# Patient Record
Sex: MALE | Race: WHITE | Employment: OTHER | URBAN - METROPOLITAN AREA
[De-identification: names, ages, dates, MRNs, and addresses within clinical notes are randomized per-mention and may not be internally consistent; named-entity substitution may affect disease eponyms.]

---

## 2017-12-06 ENCOUNTER — HOSPITAL ENCOUNTER (OUTPATIENT)
Dept: CT IMAGING | Age: 72
Discharge: HOME | End: 2017-12-06
Attending: UROLOGY
Payer: MEDICARE

## 2017-12-06 ENCOUNTER — HOSPITAL ENCOUNTER (OUTPATIENT)
Dept: LAB | Age: 72
Discharge: HOME | End: 2017-12-06
Attending: UROLOGY
Payer: MEDICARE

## 2017-12-06 DIAGNOSIS — R31.0 GROSS HEMATURIA: ICD-10-CM

## 2017-12-06 LAB — CREAT SERPL-MCNC: 0.8 MG/DL (ref 0.8–1.5)

## 2017-12-06 PROCEDURE — 36415 COLL VENOUS BLD VENIPUNCTURE: CPT | Performed by: UROLOGY

## 2017-12-06 PROCEDURE — 74011636320 HC RX REV CODE- 636/320: Performed by: UROLOGY

## 2017-12-06 PROCEDURE — 74178 CT ABD&PLV WO CNTR FLWD CNTR: CPT

## 2017-12-06 PROCEDURE — 82565 ASSAY OF CREATININE: CPT | Performed by: UROLOGY

## 2017-12-06 PROCEDURE — 74011000258 HC RX REV CODE- 258: Performed by: UROLOGY

## 2017-12-06 RX ORDER — SODIUM CHLORIDE 0.9 % (FLUSH) 0.9 %
10 SYRINGE (ML) INJECTION
Status: COMPLETED | OUTPATIENT
Start: 2017-12-06 | End: 2017-12-06

## 2017-12-06 RX ADMIN — SODIUM CHLORIDE 100 ML: 900 INJECTION, SOLUTION INTRAVENOUS at 14:29

## 2017-12-06 RX ADMIN — Medication 10 ML: at 14:29

## 2017-12-06 RX ADMIN — IOPAMIDOL 100 ML: 755 INJECTION, SOLUTION INTRAVENOUS at 14:29

## 2017-12-18 ENCOUNTER — HOSPITAL ENCOUNTER (OUTPATIENT)
Dept: SURGERY | Age: 72
Discharge: HOME | End: 2017-12-18
Payer: MEDICARE

## 2017-12-18 LAB
ANION GAP SERPL CALC-SCNC: 7 MMOL/L (ref 7–16)
APPEARANCE UR: CLEAR
BILIRUB UR QL: NEGATIVE
BUN SERPL-MCNC: 15 MG/DL (ref 8–23)
CALCIUM SERPL-MCNC: 9.3 MG/DL (ref 8.3–10.4)
CHLORIDE SERPL-SCNC: 102 MMOL/L (ref 98–107)
CO2 SERPL-SCNC: 31 MMOL/L (ref 21–32)
COLOR UR: YELLOW
CREAT SERPL-MCNC: 0.97 MG/DL (ref 0.8–1.5)
ERYTHROCYTE [DISTWIDTH] IN BLOOD BY AUTOMATED COUNT: 13.2 % (ref 11.9–14.6)
GLUCOSE SERPL-MCNC: 105 MG/DL (ref 65–100)
GLUCOSE UR STRIP.AUTO-MCNC: NEGATIVE MG/DL
HCT VFR BLD AUTO: 45.5 % (ref 41.1–50.3)
HGB BLD-MCNC: 15.6 G/DL (ref 13.6–17.2)
HGB UR QL STRIP: NEGATIVE
KETONES UR QL STRIP.AUTO: NEGATIVE MG/DL
LEUKOCYTE ESTERASE UR QL STRIP.AUTO: NEGATIVE
MCH RBC QN AUTO: 31.5 PG (ref 26.1–32.9)
MCHC RBC AUTO-ENTMCNC: 34.3 G/DL (ref 31.4–35)
MCV RBC AUTO: 91.7 FL (ref 79.6–97.8)
NITRITE UR QL STRIP.AUTO: NEGATIVE
PH UR STRIP: 6.5 [PH] (ref 5–9)
PLATELET # BLD AUTO: 205 K/UL (ref 150–450)
PMV BLD AUTO: 9.9 FL (ref 10.8–14.1)
POTASSIUM SERPL-SCNC: 4.2 MMOL/L (ref 3.5–5.1)
PROT UR STRIP-MCNC: NEGATIVE MG/DL
RBC # BLD AUTO: 4.96 M/UL (ref 4.23–5.67)
SODIUM SERPL-SCNC: 140 MMOL/L (ref 136–145)
SP GR UR REFRACTOMETRY: 1.01 (ref 1–1.02)
UROBILINOGEN UR QL STRIP.AUTO: 1 EU/DL (ref 0.2–1)
WBC # BLD AUTO: 5.7 K/UL (ref 4.3–11.1)

## 2017-12-18 PROCEDURE — 87086 URINE CULTURE/COLONY COUNT: CPT | Performed by: UROLOGY

## 2017-12-18 PROCEDURE — 36415 COLL VENOUS BLD VENIPUNCTURE: CPT | Performed by: UROLOGY

## 2017-12-18 PROCEDURE — 80048 BASIC METABOLIC PNL TOTAL CA: CPT | Performed by: UROLOGY

## 2017-12-18 PROCEDURE — 81003 URINALYSIS AUTO W/O SCOPE: CPT | Performed by: UROLOGY

## 2017-12-18 PROCEDURE — 85027 COMPLETE CBC AUTOMATED: CPT | Performed by: UROLOGY

## 2017-12-20 LAB
BACTERIA SPEC CULT: NORMAL
SERVICE CMNT-IMP: NORMAL

## 2019-04-27 ENCOUNTER — HOSPITAL ENCOUNTER (INPATIENT)
Age: 74
LOS: 2 days | Discharge: HOME | DRG: 244 | End: 2019-04-29
Attending: EMERGENCY MEDICINE | Admitting: INTERNAL MEDICINE
Payer: MEDICARE

## 2019-04-27 ENCOUNTER — APPOINTMENT (OUTPATIENT)
Dept: GENERAL RADIOLOGY | Age: 74
DRG: 244 | End: 2019-04-27
Attending: EMERGENCY MEDICINE
Payer: MEDICARE

## 2019-04-27 DIAGNOSIS — R55 SYNCOPE AND COLLAPSE: Primary | ICD-10-CM

## 2019-04-27 DIAGNOSIS — I44.2 THIRD DEGREE HEART BLOCK (HCC): ICD-10-CM

## 2019-04-27 LAB
ALBUMIN SERPL-MCNC: 3.8 G/DL (ref 3.2–4.6)
ALBUMIN/GLOB SERPL: 1.2 {RATIO} (ref 1.2–3.5)
ALP SERPL-CCNC: 70 U/L (ref 50–136)
ALT SERPL-CCNC: 34 U/L (ref 12–65)
ANION GAP SERPL CALC-SCNC: 7 MMOL/L (ref 7–16)
AST SERPL-CCNC: 22 U/L (ref 15–37)
ATRIAL RATE: 111 BPM
ATRIAL RATE: 80 BPM
BASOPHILS # BLD: 0 K/UL (ref 0–0.2)
BASOPHILS NFR BLD: 1 % (ref 0–2)
BILIRUB SERPL-MCNC: 0.7 MG/DL (ref 0.2–1.1)
BUN SERPL-MCNC: 17 MG/DL (ref 8–23)
CALCIUM SERPL-MCNC: 8.6 MG/DL (ref 8.3–10.4)
CALCULATED P AXIS, ECG09: 114 DEGREES
CALCULATED P AXIS, ECG09: 86 DEGREES
CALCULATED R AXIS, ECG10: -66 DEGREES
CALCULATED R AXIS, ECG10: -67 DEGREES
CALCULATED T AXIS, ECG11: -28 DEGREES
CALCULATED T AXIS, ECG11: 62 DEGREES
CHLORIDE SERPL-SCNC: 108 MMOL/L (ref 98–107)
CO2 SERPL-SCNC: 27 MMOL/L (ref 21–32)
CREAT SERPL-MCNC: 0.93 MG/DL (ref 0.8–1.5)
DIAGNOSIS, 93000: NORMAL
DIAGNOSIS, 93000: NORMAL
DIFFERENTIAL METHOD BLD: NORMAL
EOSINOPHIL # BLD: 0.2 K/UL (ref 0–0.8)
EOSINOPHIL NFR BLD: 3 % (ref 0.5–7.8)
ERYTHROCYTE [DISTWIDTH] IN BLOOD BY AUTOMATED COUNT: 12.6 % (ref 11.9–14.6)
GLOBULIN SER CALC-MCNC: 3.1 G/DL (ref 2.3–3.5)
GLUCOSE SERPL-MCNC: 142 MG/DL (ref 65–100)
HCT VFR BLD AUTO: 46.5 % (ref 41.1–50.3)
HGB BLD-MCNC: 15.4 G/DL (ref 13.6–17.2)
IMM GRANULOCYTES # BLD AUTO: 0 K/UL (ref 0–0.5)
IMM GRANULOCYTES NFR BLD AUTO: 1 % (ref 0–5)
INR PPP: 1.1
LYMPHOCYTES # BLD: 0.8 K/UL (ref 0.5–4.6)
LYMPHOCYTES NFR BLD: 13 % (ref 13–44)
MAGNESIUM SERPL-MCNC: 2.2 MG/DL (ref 1.8–2.4)
MCH RBC QN AUTO: 30.6 PG (ref 26.1–32.9)
MCHC RBC AUTO-ENTMCNC: 33.1 G/DL (ref 31.4–35)
MCV RBC AUTO: 92.4 FL (ref 79.6–97.8)
MONOCYTES # BLD: 0.4 K/UL (ref 0.1–1.3)
MONOCYTES NFR BLD: 6 % (ref 4–12)
NEUTS SEG # BLD: 5.1 K/UL (ref 1.7–8.2)
NEUTS SEG NFR BLD: 77 % (ref 43–78)
NRBC # BLD: 0 K/UL (ref 0–0.2)
P-R INTERVAL, ECG05: 368 MS
PLATELET # BLD AUTO: 168 K/UL (ref 150–450)
PMV BLD AUTO: 9.9 FL (ref 9.4–12.3)
POTASSIUM SERPL-SCNC: 4.3 MMOL/L (ref 3.5–5.1)
PROT SERPL-MCNC: 6.9 G/DL (ref 6.3–8.2)
PROTHROMBIN TIME: 13.7 SEC (ref 11.7–14.5)
Q-T INTERVAL, ECG07: 392 MS
Q-T INTERVAL, ECG07: 594 MS
QRS DURATION, ECG06: 138 MS
QRS DURATION, ECG06: 154 MS
QTC CALCULATION (BEZET), ECG08: 466 MS
QTC CALCULATION (BEZET), ECG08: 530 MS
RBC # BLD AUTO: 5.03 M/UL (ref 4.23–5.6)
SODIUM SERPL-SCNC: 142 MMOL/L (ref 136–145)
TROPONIN I BLD-MCNC: 0 NG/ML (ref 0.02–0.05)
TROPONIN I SERPL-MCNC: 0.18 NG/ML (ref 0.02–0.05)
VENTRICULAR RATE, ECG03: 110 BPM
VENTRICULAR RATE, ECG03: 37 BPM
WBC # BLD AUTO: 6.6 K/UL (ref 4.3–11.1)

## 2019-04-27 PROCEDURE — 65620000000 HC RM CCU GENERAL

## 2019-04-27 PROCEDURE — 93005 ELECTROCARDIOGRAM TRACING: CPT | Performed by: INTERNAL MEDICINE

## 2019-04-27 PROCEDURE — 74011250636 HC RX REV CODE- 250/636: Performed by: INTERNAL MEDICINE

## 2019-04-27 PROCEDURE — 85025 COMPLETE CBC W/AUTO DIFF WBC: CPT

## 2019-04-27 PROCEDURE — 77030005318 HC CATH ELECTRD PACE TMP BARD -C

## 2019-04-27 PROCEDURE — C1894 INTRO/SHEATH, NON-LASER: HCPCS

## 2019-04-27 PROCEDURE — 99284 EMERGENCY DEPT VISIT MOD MDM: CPT | Performed by: EMERGENCY MEDICINE

## 2019-04-27 PROCEDURE — 77030014166 HC ELECTRD ECG PACE BARD -C

## 2019-04-27 PROCEDURE — 84484 ASSAY OF TROPONIN QUANT: CPT

## 2019-04-27 PROCEDURE — 74011250637 HC RX REV CODE- 250/637: Performed by: NURSE PRACTITIONER

## 2019-04-27 PROCEDURE — 77030004558 HC CATH ANGI DX SUPR TORQ CARD -A

## 2019-04-27 PROCEDURE — 74011250636 HC RX REV CODE- 250/636

## 2019-04-27 PROCEDURE — 74011000250 HC RX REV CODE- 250: Performed by: INTERNAL MEDICINE

## 2019-04-27 PROCEDURE — 77030019569 HC BND COMPR RAD TERU -B

## 2019-04-27 PROCEDURE — 71045 X-RAY EXAM CHEST 1 VIEW: CPT

## 2019-04-27 PROCEDURE — 85610 PROTHROMBIN TIME: CPT

## 2019-04-27 PROCEDURE — 93005 ELECTROCARDIOGRAM TRACING: CPT | Performed by: EMERGENCY MEDICINE

## 2019-04-27 PROCEDURE — 99152 MOD SED SAME PHYS/QHP 5/>YRS: CPT

## 2019-04-27 PROCEDURE — 93306 TTE W/DOPPLER COMPLETE: CPT

## 2019-04-27 PROCEDURE — C1769 GUIDE WIRE: HCPCS

## 2019-04-27 PROCEDURE — 77030019605

## 2019-04-27 PROCEDURE — 93458 L HRT ARTERY/VENTRICLE ANGIO: CPT

## 2019-04-27 PROCEDURE — 77030004559 HC CATH ANGI DX SUPT CARD -B

## 2019-04-27 PROCEDURE — 77030020263 HC SOL INJ SOD CL0.9% LFCR 1000ML

## 2019-04-27 PROCEDURE — 83735 ASSAY OF MAGNESIUM: CPT

## 2019-04-27 PROCEDURE — 4A023N7 MEASUREMENT OF CARDIAC SAMPLING AND PRESSURE, LEFT HEART, PERCUTANEOUS APPROACH: ICD-10-PCS | Performed by: INTERNAL MEDICINE

## 2019-04-27 PROCEDURE — B2151ZZ FLUOROSCOPY OF LEFT HEART USING LOW OSMOLAR CONTRAST: ICD-10-PCS | Performed by: INTERNAL MEDICINE

## 2019-04-27 PROCEDURE — 33210 INSERT ELECTRD/PM CATH SNGL: CPT

## 2019-04-27 PROCEDURE — 36415 COLL VENOUS BLD VENIPUNCTURE: CPT

## 2019-04-27 PROCEDURE — 77030002996 HC SUT SLK J&J -A

## 2019-04-27 PROCEDURE — B2111ZZ FLUOROSCOPY OF MULTIPLE CORONARY ARTERIES USING LOW OSMOLAR CONTRAST: ICD-10-PCS | Performed by: INTERNAL MEDICINE

## 2019-04-27 PROCEDURE — 74011250636 HC RX REV CODE- 250/636: Performed by: NURSE PRACTITIONER

## 2019-04-27 PROCEDURE — 74011636320 HC RX REV CODE- 636/320: Performed by: INTERNAL MEDICINE

## 2019-04-27 PROCEDURE — 80053 COMPREHEN METABOLIC PANEL: CPT

## 2019-04-27 PROCEDURE — 99153 MOD SED SAME PHYS/QHP EA: CPT

## 2019-04-27 RX ORDER — LIDOCAINE HYDROCHLORIDE 10 MG/ML
1-20 INJECTION INFILTRATION; PERINEURAL ONCE
Status: COMPLETED | OUTPATIENT
Start: 2019-04-27 | End: 2019-04-27

## 2019-04-27 RX ORDER — SODIUM CHLORIDE 9 MG/ML
75 INJECTION, SOLUTION INTRAVENOUS CONTINUOUS
Status: DISCONTINUED | OUTPATIENT
Start: 2019-04-27 | End: 2019-04-29 | Stop reason: HOSPADM

## 2019-04-27 RX ORDER — CEFAZOLIN SODIUM/WATER 2 G/20 ML
2 SYRINGE (ML) INTRAVENOUS
Status: DISCONTINUED | OUTPATIENT
Start: 2019-04-28 | End: 2019-04-28

## 2019-04-27 RX ORDER — SODIUM CHLORIDE 0.9 % (FLUSH) 0.9 %
5-40 SYRINGE (ML) INJECTION EVERY 8 HOURS
Status: DISCONTINUED | OUTPATIENT
Start: 2019-04-27 | End: 2019-04-29 | Stop reason: HOSPADM

## 2019-04-27 RX ORDER — ASPIRIN 81 MG/1
81 TABLET ORAL
Status: DISCONTINUED | OUTPATIENT
Start: 2019-04-27 | End: 2019-04-29 | Stop reason: HOSPADM

## 2019-04-27 RX ORDER — ONDANSETRON 2 MG/ML
4 INJECTION INTRAMUSCULAR; INTRAVENOUS
Status: ACTIVE | OUTPATIENT
Start: 2019-04-27 | End: 2019-04-28

## 2019-04-27 RX ORDER — AMLODIPINE BESYLATE 5 MG/1
5 TABLET ORAL
Status: DISCONTINUED | OUTPATIENT
Start: 2019-04-27 | End: 2019-04-29 | Stop reason: HOSPADM

## 2019-04-27 RX ORDER — MORPHINE SULFATE 2 MG/ML
1 INJECTION, SOLUTION INTRAMUSCULAR; INTRAVENOUS
Status: DISCONTINUED | OUTPATIENT
Start: 2019-04-27 | End: 2019-04-28 | Stop reason: SDUPTHER

## 2019-04-27 RX ORDER — HYDROCODONE BITARTRATE AND ACETAMINOPHEN 5; 325 MG/1; MG/1
1 TABLET ORAL
Status: DISCONTINUED | OUTPATIENT
Start: 2019-04-27 | End: 2019-04-29 | Stop reason: HOSPADM

## 2019-04-27 RX ORDER — DOPAMINE HYDROCHLORIDE 320 MG/100ML
0-20 INJECTION, SOLUTION INTRAVENOUS
Status: DISCONTINUED | OUTPATIENT
Start: 2019-04-27 | End: 2019-04-29 | Stop reason: HOSPADM

## 2019-04-27 RX ORDER — ATORVASTATIN CALCIUM 10 MG/1
20 TABLET, FILM COATED ORAL
Status: DISCONTINUED | OUTPATIENT
Start: 2019-04-27 | End: 2019-04-29 | Stop reason: HOSPADM

## 2019-04-27 RX ORDER — ACETAMINOPHEN 325 MG/1
650 TABLET ORAL
Status: DISCONTINUED | OUTPATIENT
Start: 2019-04-27 | End: 2019-04-29 | Stop reason: HOSPADM

## 2019-04-27 RX ORDER — MIDAZOLAM HYDROCHLORIDE 1 MG/ML
.5-5 INJECTION, SOLUTION INTRAMUSCULAR; INTRAVENOUS
Status: DISCONTINUED | OUTPATIENT
Start: 2019-04-27 | End: 2019-04-28 | Stop reason: SDUPTHER

## 2019-04-27 RX ORDER — FENTANYL CITRATE 50 UG/ML
25-100 INJECTION, SOLUTION INTRAMUSCULAR; INTRAVENOUS
Status: DISCONTINUED | OUTPATIENT
Start: 2019-04-27 | End: 2019-04-28

## 2019-04-27 RX ORDER — SODIUM CHLORIDE 0.9 % (FLUSH) 0.9 %
5-40 SYRINGE (ML) INJECTION AS NEEDED
Status: DISCONTINUED | OUTPATIENT
Start: 2019-04-27 | End: 2019-04-29 | Stop reason: HOSPADM

## 2019-04-27 RX ORDER — NALOXONE HYDROCHLORIDE 0.4 MG/ML
0.4 INJECTION, SOLUTION INTRAMUSCULAR; INTRAVENOUS; SUBCUTANEOUS AS NEEDED
Status: DISCONTINUED | OUTPATIENT
Start: 2019-04-27 | End: 2019-04-29 | Stop reason: HOSPADM

## 2019-04-27 RX ORDER — LOSARTAN POTASSIUM 50 MG/1
100 TABLET ORAL DAILY
Status: DISCONTINUED | OUTPATIENT
Start: 2019-04-27 | End: 2019-04-29 | Stop reason: HOSPADM

## 2019-04-27 RX ORDER — HEPARIN SODIUM 200 [USP'U]/100ML
3 INJECTION, SOLUTION INTRAVENOUS CONTINUOUS
Status: DISCONTINUED | OUTPATIENT
Start: 2019-04-27 | End: 2019-04-29 | Stop reason: HOSPADM

## 2019-04-27 RX ADMIN — LIDOCAINE HYDROCHLORIDE 3 ML: 10 INJECTION, SOLUTION INFILTRATION; PERINEURAL at 13:40

## 2019-04-27 RX ADMIN — Medication 10 ML: at 21:51

## 2019-04-27 RX ADMIN — Medication 10 ML: at 18:35

## 2019-04-27 RX ADMIN — DOPAMINE HYDROCHLORIDE 5 MCG/KG/MIN: 320 INJECTION, SOLUTION INTRAVENOUS at 12:11

## 2019-04-27 RX ADMIN — HEPARIN SODIUM 2 ML: 10000 INJECTION INTRAVENOUS; SUBCUTANEOUS at 13:44

## 2019-04-27 RX ADMIN — MIDAZOLAM HYDROCHLORIDE 1 MG: 1 INJECTION, SOLUTION INTRAMUSCULAR; INTRAVENOUS at 13:27

## 2019-04-27 RX ADMIN — FENTANYL CITRATE 25 MCG: 50 INJECTION, SOLUTION INTRAMUSCULAR; INTRAVENOUS at 13:43

## 2019-04-27 RX ADMIN — LOSARTAN POTASSIUM 100 MG: 50 TABLET ORAL at 18:34

## 2019-04-27 RX ADMIN — LIDOCAINE HYDROCHLORIDE 6 ML: 10 INJECTION, SOLUTION INFILTRATION; PERINEURAL at 13:27

## 2019-04-27 RX ADMIN — AMLODIPINE BESYLATE 5 MG: 10 TABLET ORAL at 11:03

## 2019-04-27 RX ADMIN — FENTANYL CITRATE 25 MCG: 50 INJECTION, SOLUTION INTRAMUSCULAR; INTRAVENOUS at 13:27

## 2019-04-27 RX ADMIN — Medication 5 ML: at 14:00

## 2019-04-27 RX ADMIN — MIDAZOLAM HYDROCHLORIDE 1 MG: 1 INJECTION, SOLUTION INTRAMUSCULAR; INTRAVENOUS at 13:43

## 2019-04-27 RX ADMIN — IOPAMIDOL 35 ML: 755 INJECTION, SOLUTION INTRAVENOUS at 13:54

## 2019-04-27 RX ADMIN — HEPARIN SODIUM 3 ML/HR: 5000 INJECTION, SOLUTION INTRAVENOUS; SUBCUTANEOUS at 13:40

## 2019-04-27 RX ADMIN — SODIUM CHLORIDE 75 ML/HR: 900 INJECTION, SOLUTION INTRAVENOUS at 18:35

## 2019-04-27 RX ADMIN — SODIUM CHLORIDE 75 ML/HR: 900 INJECTION, SOLUTION INTRAVENOUS at 21:49

## 2019-04-27 RX ADMIN — ATORVASTATIN CALCIUM 20 MG: 10 TABLET, FILM COATED ORAL at 21:49

## 2019-04-27 RX ADMIN — HEPARIN SODIUM 3 ML/HR: 5000 INJECTION, SOLUTION INTRAVENOUS; SUBCUTANEOUS at 13:44

## 2019-04-27 NOTE — ED PROVIDER NOTES
HPI: 
68 M history high blood pressure, high cholesterol, prior right bundle branch block is here with 3 syncopal episodes this morning. Recall feeling flushed, lightheaded and then completely passes out. This occurred 3 times in a row. Denies any chest pain before him. Did have some shortness of breath when he became lightheaded. No prior history of heart disease. No prior stroke. Not on anticoagulant. No leg swelling. He is on losartan, amlodipine and a statin.  by medic. Gave fluid and to dose of atropine without improvement in heart rate ROS Constitutional: No fever, no chills Skin: no rash Eye: No vision changes ENMT:  
Respiratory: + shortness of breath, no cough Cardiovascular: No chest pain, no palpitations Gastrointestinal: No vomiting, no nausea, no diarrhea, no abdominal pain : No dysuria MSK: No back pain, no muscle pain Neuro: No headache, no change in mental status, no numbness, no tingling, no weakness Psych:  
Endocrine:  
All other review of systems positive per history of present illness and the above otherwise negative or noncontributory. Visit Vitals /72 Pulse (!) 37 Temp 96.7 °F (35.9 °C) Resp 16 Ht 5' 10\" (1.778 m) Wt 81.6 kg (180 lb) SpO2 92% BMI 25.83 kg/m² Past Medical History:  
Diagnosis Date  Aortic valve sclerosis  Bundle branch block  Cancer [facility])   
 prostate  Diverticulitis  High cholesterol  History of basal cell cancer   
 forehead  History of colonic polyps  History of prostate cancer 2013 External Beam and brachytherapy  Hypertension  Mild aortic stenosis  PVC (premature ventricular contraction)  Varicella Past Surgical History:  
Procedure Laterality Date  HX CAROTID ENDARTERECTOMY Right 12/15/14  HX COLONOSCOPY  11/2012  HX LYMPH NODE DISSECTION Prior to Admission Medications Prescriptions Last Dose Informant Patient Reported? Taking? amLODIPine (NORVASC) 5 mg tablet   No No  
Sig: Take 1 Tab by mouth every morning. Indications: high blood pressure  
aspirin delayed-release 81 mg tablet   Yes No  
Sig: Take 81 mg by mouth every morning. Indications: MYOCARDIAL INFARCTION PREVENTION, continue  
atorvastatin (LIPITOR) 20 mg tablet   No No  
Sig: Take 1 Tab by mouth nightly. losartan (COZAAR) 100 mg tablet   No No  
Sig: Take 1 Tab by mouth daily. Facility-Administered Medications: None Adult Exam  
General: alert, no acute distress Head: normocephalic, atraumatic ENT: moist mucous membranes Neck: supple, non-tender; full range of motion Cardiovascular: Bradycardic, normal peripheral perfusion, no edema. Equal pulses Respiratory:  normal respirations; no wheezing, rales or rhonchi Gastrointestinal: soft, non-tender; no rebound or guarding, no peritoneal signs, no distension Back: non-tender, full range of motion Musculoskeletal: normal range of motion, normal strength, no gross deformities Neurological: alert and oriented x 4, no gross focal deficits; normal speech Psychiatric: cooperative; appropriate mood and affect MDM:  
EKG rate of 37 appear consistent with a third-degree heart block with widened bundle branch. Obvious AV dissociation noted. No STEMI noted. Denies any chest pain. Concerned for complete heart block causing syncopal episode. Neuro intact. Equal pulses. Do not suspect dissection causing symptoms. No chest pain or shortness of breath at this time. We will obtain lab work including magnesium levels. Spoke with Gonzalo Rivera from cardiology. He is here at bedside to see patient. 11:18 AM 
Patient heart rate dropped down to 25. He was symptomatic with dizziness and feeling flushed like before passing out. The head of the bed was lower. Patient is now lying flat. Heart rate remained 25-27. We do have pacer pad on.   Cardiology does not want any pacing activities at this time. It is currently on monitor. Nursing staff has communicated with cardiology team about the change in patient condition. Recent Results (from the past 12 hour(s)) EKG, 12 LEAD, INITIAL Collection Time: 04/27/19  9:57 AM  
Result Value Ref Range Ventricular Rate 37 BPM  
 Atrial Rate 80 BPM  
 QRS Duration 154 ms Q-T Interval 594 ms QTC Calculation (Bezet) 466 ms Calculated P Axis 86 degrees Calculated R Axis -67 degrees Calculated T Axis -28 degrees Diagnosis    
  !! AGE AND GENDER SPECIFIC ECG ANALYSIS !! Sinus rhythm with A-V dissociation and Idioventricular rhythm Right bundle branch block Left anterior fascicular block 
!!! Bifascicular block !!! Left ventricular hypertrophy with QRS widening Abnormal ECG When compared with ECG of 11-DEC-2014 13:21, 
Idioventricular rhythm has replaced Sinus rhythm Vent. rate has decreased BY  23 BPM 
  
POC TROPONIN-I Collection Time: 04/27/19 10:04 AM  
Result Value Ref Range Troponin-I (POC) 0 (L) 0.02 - 0.05 ng/ml CBC WITH AUTOMATED DIFF Collection Time: 04/27/19 10:08 AM  
Result Value Ref Range WBC 6.6 4.3 - 11.1 K/uL  
 RBC 5.03 4.23 - 5.6 M/uL  
 HGB 15.4 13.6 - 17.2 g/dL HCT 46.5 41.1 - 50.3 % MCV 92.4 79.6 - 97.8 FL  
 MCH 30.6 26.1 - 32.9 PG  
 MCHC 33.1 31.4 - 35.0 g/dL  
 RDW 12.6 11.9 - 14.6 % PLATELET 747 416 - 234 K/uL MPV 9.9 9.4 - 12.3 FL ABSOLUTE NRBC 0.00 0.0 - 0.2 K/uL  
 DF AUTOMATED NEUTROPHILS 77 43 - 78 % LYMPHOCYTES 13 13 - 44 % MONOCYTES 6 4.0 - 12.0 % EOSINOPHILS 3 0.5 - 7.8 % BASOPHILS 1 0.0 - 2.0 % IMMATURE GRANULOCYTES 1 0.0 - 5.0 %  
 ABS. NEUTROPHILS 5.1 1.7 - 8.2 K/UL  
 ABS. LYMPHOCYTES 0.8 0.5 - 4.6 K/UL  
 ABS. MONOCYTES 0.4 0.1 - 1.3 K/UL  
 ABS. EOSINOPHILS 0.2 0.0 - 0.8 K/UL  
 ABS. BASOPHILS 0.0 0.0 - 0.2 K/UL  
 ABS. IMM. GRANS. 0.0 0.0 - 0.5 K/UL METABOLIC PANEL, COMPREHENSIVE  Collection Time: 04/27/19 10:08 AM  
 Result Value Ref Range Sodium 142 136 - 145 mmol/L Potassium 4.3 3.5 - 5.1 mmol/L Chloride 108 (H) 98 - 107 mmol/L  
 CO2 27 21 - 32 mmol/L Anion gap 7 7 - 16 mmol/L Glucose 142 (H) 65 - 100 mg/dL BUN 17 8 - 23 MG/DL Creatinine 0.93 0.8 - 1.5 MG/DL  
 GFR est AA >60 >60 ml/min/1.73m2 GFR est non-AA >60 >60 ml/min/1.73m2 Calcium 8.6 8.3 - 10.4 MG/DL Bilirubin, total 0.7 0.2 - 1.1 MG/DL  
 ALT (SGPT) 34 12 - 65 U/L  
 AST (SGOT) 22 15 - 37 U/L Alk. phosphatase 70 50 - 136 U/L Protein, total 6.9 6.3 - 8.2 g/dL Albumin 3.8 3.2 - 4.6 g/dL Globulin 3.1 2.3 - 3.5 g/dL A-G Ratio 1.2 1.2 - 3.5 PROTHROMBIN TIME + INR Collection Time: 04/27/19 10:08 AM  
Result Value Ref Range Prothrombin time 13.7 11.7 - 14.5 sec INR 1.1 MAGNESIUM Collection Time: 04/27/19 10:08 AM  
Result Value Ref Range Magnesium 2.2 1.8 - 2.4 mg/dL Xr Chest [facility] Result Date: 4/27/2019 PORTABLE CHEST, April 27, 2019 at 1013 hours CLINICAL HISTORY:  Shortness of breath. COMPARISON:  None. FINDINGS:  AP erect image demonstrates no confluent infiltrate or significant pleural fluid. There are prominent interstitial markings at both lung bases. The heart size is within normal limits without evidence of congestive heart failure or pneumothorax. The bony thorax appears intact on this view. There are overlying radiopaque support devices. IMPRESSION:  BIBASILAR INTERSTITIAL LUNG DISEASE, THE CHRONICITY OF WHICH IS UNCERTAIN WITHOUT PRIOR STUDY FOR COMPARISON. Dragon voice recognition software was used to create this note. Although the note has been reviewed and corrected where necessary, additional errors may have been overlooked and remain in the text.

## 2019-04-27 NOTE — PROGRESS NOTES
Pt with ventricular standstill with agonal breathing but with spontaneous return to sinus tachycardia. Dr. Payne Handronny and myself at bedside. He will be taken to cath lab emergently for temporary PM wire and Left heart cath. IV dopamine at 3.5 ucgs. To support HR> denies chest pain.

## 2019-04-27 NOTE — H&P
[facility] Cardiology H&P Admitting Cardiologist:Dr. Tyron Jeffery Primary Cardiologist:none Primary Care Physician:Dr. Foy Osler Subjective:  
 
Donald Gonzalez is a 68 y.o. male who presents with acute syncopal event this am while lying in bed without injury. He has been having recurrent dizziness for last year but no shauna syncope until today. IN ER he was noted to be in third degree heart block with . ECG noted LAFB. Hx of HTN on no rate slowing meds. Prior endartectomy, prostate cancer with radiation 5 years ago. Denies CP. No other complaints. No prior CVA DM or CAD. Past Medical History:  
Diagnosis Date  Aortic valve sclerosis  Bundle branch block  Cancer [facility])   
 prostate  Diverticulitis  High cholesterol  History of basal cell cancer   
 forehead  History of colonic polyps  History of prostate cancer 2013 External Beam and brachytherapy  Hypertension  Mild aortic stenosis  PVC (premature ventricular contraction)  Varicella Past Surgical History:  
Procedure Laterality Date  HX CAROTID ENDARTERECTOMY Right 12/15/14  HX COLONOSCOPY  11/2012  HX LYMPH NODE DISSECTION No current facility-administered medications for this encounter. Current Outpatient Medications Medication Sig  
 atorvastatin (LIPITOR) 20 mg tablet Take 1 Tab by mouth nightly.  amLODIPine (NORVASC) 5 mg tablet Take 1 Tab by mouth every morning. Indications: high blood pressure  losartan (COZAAR) 100 mg tablet Take 1 Tab by mouth daily.  aspirin delayed-release 81 mg tablet Take 81 mg by mouth every morning. Indications: MYOCARDIAL INFARCTION PREVENTION, continue No Known Allergies Social History Tobacco Use  Smoking status: Former Smoker Packs/day: 0.25 Years: 6.00 Pack years: 1.50 Types: Cigarettes  Smokeless tobacco: Never Used  Tobacco comment: quit 1975 Substance Use Topics  Alcohol use: Yes Alcohol/week: 1.0 oz Types: 2 Standard drinks or equivalent per week Comment:    
  
Family History Problem Relation Age of Onset  Cancer Mother   
     colon  Heart Disease Mother  Cancer Father   
     lung  Heart Disease Sister Review of Systems Gen: Denies fever, chills, malaise or fatigue. Appetite good. HEENT: Denies frequent headaches, dizzyness, visual disturbances, Neck pain or swallowing difficulty Lungs: Denies shortness of breath, hx of COPD, breathing problems Cardiovascular: as above GI: Denies hememesis, dark tarry stools, No prior Hx of GI bleed, Denies constipation : Denies dysuria, no complaints of frequency, nocturia Heme: No prior bleeding disorders, no prior Cancer Neuro: Denies prior CVA, TIA. Endocrine: no diabetes, thyroid disorders Psychiatric: Denies anxiety, or other psychiatric illnesses. Objective:  
 
Visit Vitals /66 Pulse (!) 36 Temp 96.7 °F (35.9 °C) Resp 16 Ht 5' 10\" (1.778 m) Wt 81.6 kg (180 lb) SpO2 100% BMI 25.83 kg/m² General:Alert, cooperative, no distress, appears stated age Head: Normocephalic, without obvious abnormality, atraumatic. Eyes: Conjunctivae/corneas clear. PERRL, EOMs intact Nose:Nares normal. Septum midline. Mucosa normal. No drainage or sinus tenderness. Throat: Lips, mucosa, and tongue normal. Teeth and gums normal.  
Neck: Supple, symmetrical, trachea midline,  no carotid bruit and no JVD. Lungs:Clear to auscultation bilaterally. Chest wall: No tenderness or deformity. Heart: Regular rate and rhythm, S1, S2 normal, no murmur, click, rub or gallop. Abdomen:Soft, non-tender. Bowel sounds normal. No masses, No organomegaly. Extremities: Extremities normal, atraumatic, no cyanosis or edema. Pulses: 2+ and symmetric all extremities. Skin: Skin color, texture, turgor normal. No rashes or lesions Lymph nodes: Cervical, supraclavicular, and axillary nodes normal 
Neurologic:No focal deficits identified ECG: third degree AVB with LAFB Data Review:  
 
Recent Results (from the past 24 hour(s)) EKG, 12 LEAD, INITIAL Collection Time: 04/27/19  9:57 AM  
Result Value Ref Range Ventricular Rate 37 BPM  
 Atrial Rate 80 BPM  
 QRS Duration 154 ms Q-T Interval 594 ms QTC Calculation (Bezet) 466 ms Calculated P Axis 86 degrees Calculated R Axis -67 degrees Calculated T Axis -28 degrees Diagnosis    
  !! AGE AND GENDER SPECIFIC ECG ANALYSIS !! Sinus rhythm with A-V dissociation and Idioventricular rhythm Right bundle branch block Left anterior fascicular block 
!!! Bifascicular block !!! Left ventricular hypertrophy with QRS widening Abnormal ECG When compared with ECG of 11-DEC-2014 13:21, 
Idioventricular rhythm has replaced Sinus rhythm Vent. rate has decreased BY  23 BPM 
  
POC TROPONIN-I Collection Time: 04/27/19 10:04 AM  
Result Value Ref Range Troponin-I (POC) 0 (L) 0.02 - 0.05 ng/ml CBC WITH AUTOMATED DIFF Collection Time: 04/27/19 10:08 AM  
Result Value Ref Range WBC 6.6 4.3 - 11.1 K/uL  
 RBC 5.03 4.23 - 5.6 M/uL  
 HGB 15.4 13.6 - 17.2 g/dL HCT 46.5 41.1 - 50.3 % MCV 92.4 79.6 - 97.8 FL  
 MCH 30.6 26.1 - 32.9 PG  
 MCHC 33.1 31.4 - 35.0 g/dL  
 RDW 12.6 11.9 - 14.6 % PLATELET 252 605 - 039 K/uL MPV 9.9 9.4 - 12.3 FL ABSOLUTE NRBC 0.00 0.0 - 0.2 K/uL  
 DF AUTOMATED NEUTROPHILS 77 43 - 78 % LYMPHOCYTES 13 13 - 44 % MONOCYTES 6 4.0 - 12.0 % EOSINOPHILS 3 0.5 - 7.8 % BASOPHILS 1 0.0 - 2.0 % IMMATURE GRANULOCYTES 1 0.0 - 5.0 %  
 ABS. NEUTROPHILS 5.1 1.7 - 8.2 K/UL  
 ABS. LYMPHOCYTES 0.8 0.5 - 4.6 K/UL  
 ABS. MONOCYTES 0.4 0.1 - 1.3 K/UL  
 ABS. EOSINOPHILS 0.2 0.0 - 0.8 K/UL  
 ABS. BASOPHILS 0.0 0.0 - 0.2 K/UL  
 ABS. IMM. GRANS. 0.0 0.0 - 0.5 K/UL Assessment / Plan Principal Problem: [facility] (4/[facility]/2019)--admit to ICU with concern for progression of arrhythmia. Pacing pads in place, close cardiac monitoring. Plan for dual chamber PM placement in am. Echo today Active Problems: Hypertension ()--continue home meds. High cholesterol ()-statin Prostate cancer ([facility] (4/1/2013) Overview: External Beam and brachytherapy Carotid stenosis (12/15/2014) Overview: 12/15/14 (Dr Feliciana Spatz) R CEA for 80% stenosis Salo Bello NP

## 2019-04-27 NOTE — PROGRESS NOTES
TRANSFER - OUT REPORT: 
 
Verbal report given to monse mendez(name) on Rosey Escalante  being transferred to ccu(unit) for routine progression of care Report consisted of patients Situation, Background, Assessment and  
Recommendations(SBAR). Information from the following report(s) SBAR was reviewed with the receiving nurse. Lines:  
Peripheral IV 04/27/19 Right Antecubital (Active) Site Assessment Clean, dry, & intact 4/27/2019 10:06 AM  
Phlebitis Assessment 0 4/27/2019 10:06 AM  
Infiltration Assessment 0 4/27/2019 10:06 AM  
Dressing Status Clean, dry, & intact 4/27/2019 10:06 AM  
   
Peripheral IV 04/27/19 Left Wrist (Active) Site Assessment Clean, dry, & intact 4/27/2019 10:14 AM  
Phlebitis Assessment 0 4/27/2019 10:14 AM  
Infiltration Assessment 0 4/27/2019 10:14 AM  
Dressing Status Clean, dry, & intact 4/27/2019 10:14 AM  
  
 
Opportunity for questions and clarification was provided. Patient transported with: 
 Registered Nurse [facility] Dr Marcelo Chin No intervention Right wrist tr band 12ml Right groin 6fr sheath with temp pacer Settings vvi 50/5 No bleeding or hematoma Versed 2mg

## 2019-04-27 NOTE — PROCEDURES
Brief Cardiac Procedure Note    Patient: Margarita Wilkins MRN: [id number]  SSN: [id number]    YOB: 1945  Age: 68 y.o.   Sex: male      Date of Procedure: 4/27/2019     Pre-procedure Diagnosis: heart block    Post-procedure Diagnosis: heart block    Reason for Procedure: Cardiac Arrhythmia    Procedure: Pacemaker Insertion and LHC    Brief Description of Procedure: LHC via R radial, Temp pacer via R fem lita    Performed By: Kailey Mccormick MD     Assistants: None    Anesthesia: Moderate Sedation    Estimated Blood Loss: Less than 10 mL      Specimens: None    Implants: None    Findings: Mild non-obstructive CAD    Complications: None    Recommendations: PPM in the morning    Signed By: Kailey Mccormick MD     April 27, 2019

## 2019-04-27 NOTE — ED TRIAGE NOTES
Pt arrives via EMS. Complains of syncopal episode this morning. Pt and wife attempted to go to urgent care where he \"blacked out\" second time in the car. EMS found pt to be bradycardic in the 30's complaining of SOB and dizziness. EMS administered atropine 0.5mg x2. Pt reports improvement in symptoms. Remains bradycardic

## 2019-04-27 NOTE — PROGRESS NOTES
Patient is being admitted to floor from ER Staff was caring for patient Visited with wife in hallway Provided supportive presence Will follow Darlene Bautista, staff Celena castanon [address], [id number] [facility] Bashir  /   [email or web address]

## 2019-04-27 NOTE — ED NOTES
Pt pants removed. Warm blanket provided. Dr. Katty Richmond present. Cath lab team called. Heart Cath now planned

## 2019-04-27 NOTE — PROGRESS NOTES
TRANSFER - IN REPORT: 
 
Verbal report received from Yris Giron RN(name) on Donald Gonzalez  being received from cath lab(unit) for routine progression of care Report consisted of patients Situation, Background, Assessment and  
Recommendations(SBAR). Information from the following report(s) SBAR, Procedure Summary, MAR and Recent Results was reviewed with the receiving nurse. Opportunity for questions and clarification was provided. Assessment completed upon patients arrival to unit and care assumed.

## 2019-04-27 NOTE — ED NOTES
Pt had pause on monitor and conversion to HR of 115 at this time. Dopamine gtt stopped. NS stopped. Subsequent EKG obtained. This nurse, charge nurse, and Chante Garcia NP with cardiology to bedside.

## 2019-04-27 NOTE — ED NOTES
Pt eyes rolled back and was unresponsive. Sternal rub pt. After 10-15 secs pt screamed out and became alert and responsive. -122

## 2019-04-27 NOTE — ED NOTES
Report received from [place], [facility]. Pt resting on stretcher and placed in supine position. Pt is being monitored via zoll and cardiac monitor. No pacing at this time. HR remains in 20's. BP is trending down post norvasc administration from previous RN. Pt continues to deny SOB, CP, dizziness, HA, or nausea.

## 2019-04-27 NOTE — PROGRESS NOTES
Patient admitted to [id number]. Skin assessment complete with Denise Townsend RN. No skin breakdown noted. R groin sheath/temp pacer site intact. R radial sheath site with TR band no bleeding/hematoma. Patient is alert, oriented, able to communicate needs. BP wnl. NSR currently.

## 2019-04-27 NOTE — ED NOTES
Pt entered asystole with agonal breathing. Pt HR returned to 80's with only sternal rubs. RR and efforts have returned to baseline. Rea Bird, NP with cardiology remains at bedside. Verbal order to restart dopamine gtt at 3.5 mcg/kg/ min. GTT restarted

## 2019-04-27 NOTE — ED NOTES
Spoke with henrik with cardiology regarding pt's hr falling to 26, no further orders given at this time and he states he will call Dr davenport at this time for notification

## 2019-04-27 NOTE — ED NOTES
Pt bp 99/54 with HR of 27. Paged henrik with cardiology who called back with verbal oders of 1L normal saline bolus.

## 2019-04-28 ENCOUNTER — APPOINTMENT (OUTPATIENT)
Dept: GENERAL RADIOLOGY | Age: 74
DRG: 244 | End: 2019-04-28
Attending: INTERNAL MEDICINE
Payer: MEDICARE

## 2019-04-28 LAB
ANION GAP SERPL CALC-SCNC: 6 MMOL/L (ref 7–16)
ATRIAL RATE: 62 BPM
BUN SERPL-MCNC: 11 MG/DL (ref 8–23)
CALCIUM SERPL-MCNC: 8.2 MG/DL (ref 8.3–10.4)
CALCULATED P AXIS, ECG09: 91 DEGREES
CALCULATED R AXIS, ECG10: -78 DEGREES
CALCULATED T AXIS, ECG11: 32 DEGREES
CHLORIDE SERPL-SCNC: 108 MMOL/L (ref 98–107)
CO2 SERPL-SCNC: 28 MMOL/L (ref 21–32)
CREAT SERPL-MCNC: 0.78 MG/DL (ref 0.8–1.5)
DIAGNOSIS, 93000: NORMAL
ERYTHROCYTE [DISTWIDTH] IN BLOOD BY AUTOMATED COUNT: 12.5 % (ref 11.9–14.6)
GLUCOSE SERPL-MCNC: 118 MG/DL (ref 65–100)
HCT VFR BLD AUTO: 44.8 % (ref 41.1–50.3)
HGB BLD-MCNC: 15.3 G/DL (ref 13.6–17.2)
MCH RBC QN AUTO: 31.1 PG (ref 26.1–32.9)
MCHC RBC AUTO-ENTMCNC: 34.2 G/DL (ref 31.4–35)
MCV RBC AUTO: 91.1 FL (ref 79.6–97.8)
NRBC # BLD: 0 K/UL (ref 0–0.2)
P-R INTERVAL, ECG05: 250 MS
PLATELET # BLD AUTO: 183 K/UL (ref 150–450)
PMV BLD AUTO: 9.6 FL (ref 9.4–12.3)
POTASSIUM SERPL-SCNC: 3.6 MMOL/L (ref 3.5–5.1)
Q-T INTERVAL, ECG07: 434 MS
QRS DURATION, ECG06: 148 MS
QTC CALCULATION (BEZET), ECG08: 440 MS
RBC # BLD AUTO: 4.92 M/UL (ref 4.23–5.6)
SODIUM SERPL-SCNC: 142 MMOL/L (ref 136–145)
VENTRICULAR RATE, ECG03: 62 BPM
WBC # BLD AUTO: 9.7 K/UL (ref 4.3–11.1)

## 2019-04-28 PROCEDURE — 80048 BASIC METABOLIC PNL TOTAL CA: CPT

## 2019-04-28 PROCEDURE — 77030022704 HC SUT VLOC COVD -B

## 2019-04-28 PROCEDURE — 77030008467 HC STPLR SKN COVD -B

## 2019-04-28 PROCEDURE — 77030013687 HC GD NDL BARD -B

## 2019-04-28 PROCEDURE — 74011250636 HC RX REV CODE- 250/636: Performed by: INTERNAL MEDICINE

## 2019-04-28 PROCEDURE — C1894 INTRO/SHEATH, NON-LASER: HCPCS

## 2019-04-28 PROCEDURE — 65660000000 HC RM CCU STEPDOWN

## 2019-04-28 PROCEDURE — 0JH606Z INSERTION OF PACEMAKER, DUAL CHAMBER INTO CHEST SUBCUTANEOUS TISSUE AND FASCIA, OPEN APPROACH: ICD-10-PCS | Performed by: INTERNAL MEDICINE

## 2019-04-28 PROCEDURE — 99152 MOD SED SAME PHYS/QHP 5/>YRS: CPT

## 2019-04-28 PROCEDURE — 85027 COMPLETE CBC AUTOMATED: CPT

## 2019-04-28 PROCEDURE — 02H63JZ INSERTION OF PACEMAKER LEAD INTO RIGHT ATRIUM, PERCUTANEOUS APPROACH: ICD-10-PCS | Performed by: INTERNAL MEDICINE

## 2019-04-28 PROCEDURE — 74011000250 HC RX REV CODE- 250: Performed by: INTERNAL MEDICINE

## 2019-04-28 PROCEDURE — 77030003029 HC SUT VCRL J&J -B

## 2019-04-28 PROCEDURE — C1898 LEAD, PMKR, OTHER THAN TRANS: HCPCS

## 2019-04-28 PROCEDURE — 74011250636 HC RX REV CODE- 250/636

## 2019-04-28 PROCEDURE — C1785 PMKR, DUAL, RATE-RESP: HCPCS

## 2019-04-28 PROCEDURE — 33208 INSRT HEART PM ATRIAL & VENT: CPT

## 2019-04-28 PROCEDURE — 74011250637 HC RX REV CODE- 250/637: Performed by: INTERNAL MEDICINE

## 2019-04-28 PROCEDURE — 99153 MOD SED SAME PHYS/QHP EA: CPT

## 2019-04-28 PROCEDURE — C1892 INTRO/SHEATH,FIXED,PEEL-AWAY: HCPCS

## 2019-04-28 PROCEDURE — 74011250637 HC RX REV CODE- 250/637: Performed by: NURSE PRACTITIONER

## 2019-04-28 PROCEDURE — 77030002912 HC SUT ETHBND J&J -A

## 2019-04-28 PROCEDURE — 02HK3JZ INSERTION OF PACEMAKER LEAD INTO RIGHT VENTRICLE, PERCUTANEOUS APPROACH: ICD-10-PCS | Performed by: INTERNAL MEDICINE

## 2019-04-28 PROCEDURE — 93005 ELECTROCARDIOGRAM TRACING: CPT | Performed by: INTERNAL MEDICINE

## 2019-04-28 PROCEDURE — 71045 X-RAY EXAM CHEST 1 VIEW: CPT

## 2019-04-28 PROCEDURE — 36592 COLLECT BLOOD FROM PICC: CPT

## 2019-04-28 PROCEDURE — L3650 SO 8 ABD RESTRAINT PRE OTS: HCPCS

## 2019-04-28 PROCEDURE — 77030012935 HC DRSG AQUACEL BMS -B

## 2019-04-28 RX ORDER — CEFAZOLIN SODIUM/WATER 2 G/20 ML
2 SYRINGE (ML) INTRAVENOUS EVERY 8 HOURS
Status: COMPLETED | OUTPATIENT
Start: 2019-04-28 | End: 2019-04-29

## 2019-04-28 RX ORDER — LIDOCAINE HYDROCHLORIDE 10 MG/ML
10-200 INJECTION INFILTRATION; PERINEURAL ONCE
Status: COMPLETED | OUTPATIENT
Start: 2019-04-28 | End: 2019-04-28

## 2019-04-28 RX ORDER — MORPHINE SULFATE 2 MG/ML
2 INJECTION, SOLUTION INTRAMUSCULAR; INTRAVENOUS
Status: DISCONTINUED | OUTPATIENT
Start: 2019-04-28 | End: 2019-04-29 | Stop reason: HOSPADM

## 2019-04-28 RX ORDER — SODIUM CHLORIDE 0.9 % (FLUSH) 0.9 %
5-40 SYRINGE (ML) INJECTION AS NEEDED
Status: DISCONTINUED | OUTPATIENT
Start: 2019-04-28 | End: 2019-04-29 | Stop reason: HOSPADM

## 2019-04-28 RX ORDER — SODIUM CHLORIDE 0.9 % (FLUSH) 0.9 %
5-40 SYRINGE (ML) INJECTION EVERY 8 HOURS
Status: DISCONTINUED | OUTPATIENT
Start: 2019-04-28 | End: 2019-04-29 | Stop reason: HOSPADM

## 2019-04-28 RX ORDER — POTASSIUM CHLORIDE 20 MEQ/1
40 TABLET, EXTENDED RELEASE ORAL
Status: COMPLETED | OUTPATIENT
Start: 2019-04-28 | End: 2019-04-28

## 2019-04-28 RX ORDER — CEFAZOLIN SODIUM/WATER 2 G/20 ML
2 SYRINGE (ML) INTRAVENOUS ONCE
Status: COMPLETED | OUTPATIENT
Start: 2019-04-28 | End: 2019-04-28

## 2019-04-28 RX ORDER — FENTANYL CITRATE 50 UG/ML
25-50 INJECTION, SOLUTION INTRAMUSCULAR; INTRAVENOUS
Status: DISCONTINUED | OUTPATIENT
Start: 2019-04-28 | End: 2019-04-29 | Stop reason: HOSPADM

## 2019-04-28 RX ORDER — MIDAZOLAM HYDROCHLORIDE 1 MG/ML
.5-2 INJECTION, SOLUTION INTRAMUSCULAR; INTRAVENOUS
Status: DISCONTINUED | OUTPATIENT
Start: 2019-04-28 | End: 2019-04-29 | Stop reason: HOSPADM

## 2019-04-28 RX ADMIN — FENTANYL CITRATE 50 MCG: 50 INJECTION, SOLUTION INTRAMUSCULAR; INTRAVENOUS at 10:49

## 2019-04-28 RX ADMIN — Medication 10 ML: at 13:15

## 2019-04-28 RX ADMIN — Medication 10 ML: at 13:16

## 2019-04-28 RX ADMIN — SODIUM CHLORIDE 50000 UNITS: 900 INJECTION, SOLUTION INTRAVENOUS at 11:22

## 2019-04-28 RX ADMIN — MIDAZOLAM HYDROCHLORIDE 1 MG: 1 INJECTION, SOLUTION INTRAMUSCULAR; INTRAVENOUS at 10:53

## 2019-04-28 RX ADMIN — Medication 2 G: at 17:33

## 2019-04-28 RX ADMIN — Medication 2 G: at 10:36

## 2019-04-28 RX ADMIN — Medication 10 ML: at 21:44

## 2019-04-28 RX ADMIN — POTASSIUM CHLORIDE 40 MEQ: 20 TABLET, EXTENDED RELEASE ORAL at 07:06

## 2019-04-28 RX ADMIN — LOSARTAN POTASSIUM 100 MG: 50 TABLET ORAL at 09:17

## 2019-04-28 RX ADMIN — LIDOCAINE HYDROCHLORIDE 20 ML: 10 INJECTION, SOLUTION INFILTRATION; PERINEURAL at 11:00

## 2019-04-28 RX ADMIN — ASPIRIN 81 MG: 81 TABLET, COATED ORAL at 07:06

## 2019-04-28 RX ADMIN — Medication 10 ML: at 06:00

## 2019-04-28 RX ADMIN — HYDROCODONE BITARTRATE AND ACETAMINOPHEN 1 TABLET: 5; 325 TABLET ORAL at 07:45

## 2019-04-28 RX ADMIN — ATORVASTATIN CALCIUM 20 MG: 10 TABLET, FILM COATED ORAL at 21:43

## 2019-04-28 RX ADMIN — MIDAZOLAM HYDROCHLORIDE 2 MG: 1 INJECTION, SOLUTION INTRAMUSCULAR; INTRAVENOUS at 10:49

## 2019-04-28 RX ADMIN — MIDAZOLAM HYDROCHLORIDE 1 MG: 1 INJECTION, SOLUTION INTRAMUSCULAR; INTRAVENOUS at 11:15

## 2019-04-28 RX ADMIN — AMLODIPINE BESYLATE 5 MG: 10 TABLET ORAL at 07:06

## 2019-04-28 RX ADMIN — FENTANYL CITRATE 25 MCG: 50 INJECTION, SOLUTION INTRAMUSCULAR; INTRAVENOUS at 10:53

## 2019-04-28 RX ADMIN — Medication 10 ML: at 21:43

## 2019-04-28 RX ADMIN — FENTANYL CITRATE 25 MCG: 50 INJECTION, SOLUTION INTRAMUSCULAR; INTRAVENOUS at 11:15

## 2019-04-28 NOTE — PROGRESS NOTES
Bedside report received from Precious Lei RN. Pt is pleasant and conversant. A&Ox4. R groin with venous sheath present. No bleeding/hematoma noted at site. NSR rhythm on bedside monitor with HR 70's. Rare pacemaker spikes noted. Dopamine infusing at 3 mcg/kg/min. VS stable. Pt complaint free. Will continue to monitor.

## 2019-04-28 NOTE — PROCEDURES
[facility]  CARDIAC CATH    Name:  Vani Neves  MR#:  [id number]  :  1945  ACCOUNT #:  [de-identified]  DATE OF SERVICE:  2019. SURGEON:  Queta Redd MD     ASSISTANT:  None. ESTIMATED BLOOD LOSS:  Less than 5 mL. Morro Shisera SPECIMENS REMOVED:  None. COMPLICATIONS:  None. INDICATION:  The patient is a 81-year-old male who presented to [address] [place] [address] Emergency Room after an episode of syncope. He was found to be in complete heart block. He tolerated the heart block until he underwent an echocardiogram this morning where he went into complete ventricular arrest.  It was decided to put in a temporary pacemaker. Prior to placing a temporary pacemaker, he was placed on dopamine and had significant ST elevation albeit in the setting of a right bundle branch block in the inferior leads concerning for ischemic coronary artery disease. It was decided to also proceed with a left heart catheterization. SEDATION:  The patient was given 1 mg of Versed, 25 mcg of fentanyl and monitored for conscious sedation beginning at 1325 and ending at 1350 by nurse, Alvarado Rubin. Preprocedure timeout was completed. Mallampati score of 2, ASA score of 4. PROCEDURE:  After emergent consent, the patient was prepped and draped in the usual sterile fashion. The right wrist was infiltrated with lidocaine and the right radial artery was accessed via the modified Seldinger technique as well. A Terumo vein was used for hemostasis. CATHETERS USED:  Included a 6-[place]izen [place] JL3.5, 6-[place] JR5, and a temporary venous pacing wire. FINDINGS:  Left ventricle:  Left ventriculogram was not completed as the patient recently underwent an echocardiogram.    LVEDP:  15 mmHg. Left main:  Normal.    Left anterior descending artery had luminal irregularities throughout with a focal stenosis near the proximal portion of the vessel of approximately 20%.   The circumflex artery had a focal stenosis in the proximal portion of approximately 30%. The right coronary artery which was dominant had luminal irregularities throughout without focal stenosis greater than 20%. At the beginning of the procedure, a pacemaker was placed in the femoral vein and taken up across the tricuspid valve into the right ventricle and was paced adequately with excellent capture. CONCLUSION:  This is a 71-year-old male with mild nonobstructive coronary artery disease and a new temporary pacer who will likely undergo a permanent pacemaker placement tomorrow morning.         Governor MD Justice      [id number]/  D:  04/27/2019 14:08  T:  04/28/2019 3:47  JOB #:  [id number]

## 2019-04-28 NOTE — PROGRESS NOTES
Verbal bedside report given to Caio Medeiros oncoming RN. Patient's situation, background, assessment and recommendations provided. Opportunity for questions provided. Oncoming RN assumed care of patient.

## 2019-04-28 NOTE — PROGRESS NOTES
TRANSFER - IN REPORT: 
 
Verbal report received from Jenny on Onita Butt  being received from Cath Lab (unit) for routine post - op Report consisted of patients Situation, Background, Assessment and  
Recommendations(SBAR). Information from the following report(s) SBAR, Kardex, OR Summary, Procedure Summary, Intake/Output, MAR, Recent Results, Med Rec Status and Cardiac Rhythm NSR with Pacemaker  was reviewed with the receiving nurse. Opportunity for questions and clarification was provided. Assessment completed upon patients arrival to unit and care assumed.

## 2019-04-28 NOTE — PROGRESS NOTES
Bedside shift change report given to DENIS Grajeda (oncoming nurse) by Meagan Escobar RN (offgoing nurse). Report included the following information SBAR, Kardex, ED Summary, Intake/Output, MAR, Recent Results, Med Rec Status, Cardiac Rhythm Sinus rhythm with demand pacing and Alarm Parameters .

## 2019-04-28 NOTE — PROGRESS NOTES
TRANSFER - OUT REPORT: 
 
Verbal report given to [facility], [facility], RN (name) on Tabitha Galindo  being transferred to 3rd floor Telemetry (unit) for routine progression of care Report consisted of patients Situation, Background, Assessment and  
Recommendations(SBAR). Information from the following report(s) SBAR, Kardex, ED Summary, OR Summary, Procedure Summary, MAR, Recent Results, Med Rec Status, Cardiac Rhythm Paced Rhythm and Alarm Parameters  was reviewed with the receiving nurse. Lines:  
Peripheral IV 04/27/19 Right Antecubital (Active) Site Assessment Clean, dry, & intact 4/28/2019  2:30 PM  
Phlebitis Assessment 0 4/28/2019  2:30 PM  
Infiltration Assessment 0 4/28/2019  2:30 PM  
Dressing Status Clean, dry, & intact 4/28/2019  2:30 PM  
Dressing Type Transparent;Tape 4/28/2019  2:30 PM  
Hub Color/Line Status Patent; Flushed 4/28/2019  2:30 PM  
Alcohol Cap Used No 4/28/2019  2:30 PM  
   
Peripheral IV 04/27/19 Left Wrist (Active) Site Assessment Clean, dry, & intact 4/28/2019  2:30 PM  
Phlebitis Assessment 0 4/28/2019  2:30 PM  
Infiltration Assessment 0 4/28/2019  2:30 PM  
Dressing Status Clean, dry, & intact 4/28/2019  2:30 PM  
Dressing Type Transparent;Tape 4/28/2019  2:30 PM  
Hub Color/Line Status Patent; Flushed 4/28/2019  2:30 PM  
Alcohol Cap Used No 4/28/2019  2:30 PM  
  
 
Opportunity for questions and clarification was provided. Patient transported with: 
 Monitor O2 @ 2 liters Tech

## 2019-04-28 NOTE — PROGRESS NOTES
TRANSFER - IN REPORT: 
 
Verbal report received from [facility] on Roselind Gamma being received from CCU for routine progression of care. Report consisted of patients Situation, Background, Assessment and Recommendations(SBAR). Information from the following report(s) SBAR, Kardex, [facility] and Cardiac Rhythm paced was reviewed. Opportunity for questions and clarification was provided. Assessment completed upon patients arrival to unit and care assumed. Patient received to room 318. Patient connected to monitor and assessment completed. Plan of care reviewed. Patient oriented to room and call light. Patient aware to use call light to communicate any chest pain or needs. Admission skin assessment completed with second RN and reveals the following: Patent's sacrum and heels are intact, no breakdown noted. Allevyn pulled back and no breakdown noted on sacrum. R. Groin site s/p temp pacer removal today with gauze and tegaderm, clean, dry and intact, no bleeding or hematoma noted. L. Subclavian site with aquacel and L arm in sling, no bleeding or hematoma noted. Patient with basal cell carcinoma removal per patient 2 weeks ago on bridge of nose.

## 2019-04-28 NOTE — PROCEDURES
Pre-Procedure Diagnosis  1. Documented non-reversible symptomatic bradycardia due to third degree atrioventricular block. Procedure Performed  1. Insertion of a dual chamber PPM    Anesthesia: * No surgery found *     Estimated Blood Loss: Less than 10 mL     Specimens: * Cannot find log *     Patient Information and Indications: The procedure, indications, risks, benefits, and alternatives were discussed with the patient and family members, who desired to proceed after questions were answered and informed consent was documented. Procedure: After informed consent was obtained, the patient was brought to the Electrophysiology Laboratory in a fasting state and was prepped and draped in sterile fashion. Prophylactic antibiotic was administered 10 minutes prior to skin incision: refer to anesthesia procedural documentation for full details. Conscious sedation was administered by anesthesia with continuous oxygen saturation measurement and blood pressure measurement. Local anesthetic (sensorcaine) was delivered to the left pectoral region and an incision was made parallel to the deltopectoral groove. A subcutaneous pocket was created using blunt dissection and electrocautery, and adequate hemostasis was established. Access x 2 was obtained under ultrasound guidance using a modified Seldinger technique with placement of 2 wires down into the RA and advanced below the diaphragm. Next, placement of a 7Fr peel-away sheath over the first guidewire was performed. A permanent RV pm lead was then advanced under fluoroscopic guidance via the 7Fr sheath into the RV in a stable position with satisfactory sensing and pacing characteristics. The peel away sheath was removed. A 7Fr Safe-sheath was then placed in the second guidewire. A permanent pacing lead was advanced under fluoroscopic guidance and positioned in the right atrium.  Stable RA appendage position with satisfactory sensing and pacing characteristics was obtained. The sheath was peeled. The leads were sutured to the underlying pectoralis fascia using 2 non-absorbable sutures around each lead's collar. The lead slack and position was assessed under fluoroscopy while securing the lead collars to ensure proper length. Final lead testing via the pacing system analyzer (PSA) demonstrated stable sensing, impedance and pacing thresholds. The lead pins were then cleaned with antibiotic soaked gauze, dried gently, and attached to a new pacemaker generator. Pins were directly observed to pass the tip electrode, and the ring hex wrench screws were secured, and leads tug tested. The device and leads were gently positioned within the pocket after the pocket was irrigated copiously with a saline antimicrobial solution. The wound was closed with multiple layers of absorbable suture followed by skin closure with staples. Fluoroscopic images were interpreted by me, in multiple projections. Final device position was confirmed by stored fluoroscopic image from device pocket to lead tips in AP projection. The patient tolerated the procedure well and left the lab in good condition. Complications: None     Lead Data:    Pulse Generator Model #  Serial # Location Implant/Explant   [id number] Left Pectoral Implant     Lead Model Number  Serial Number Lead position Implant/Explant   RA [id number] Biotronik [id number] RA Appendage Implant   RV [id number] Biotronik [id number] RV Grady Implant     Lead Sensitivity and Threshold  Lead R or P sensitivity (mv) Threshold (V) Threshold PW (msec) Impedance (ohms) Final output Voltage (V) Final PW (msec)   RA 2.5 1.2 0.4 663 3.6 0.4   RV 9.0 0.8 0.4 760 3.6 0.4     Bradycardia Settings  Kvng Mode LRL URL Pace AVD (ms) Sense AVD (ms) Rate Response Mode Switching Mode SW Rate   DDD-CLS 60 130 300 300 On On 160     No Contrast     Fluoro Time: 2.2 min    Estimated Blood Loss: Less than 10 ml     Impression: 1.  Successful implantation and testing of a Biotronik dual chamber Pacemaker. Aline Bobby MD, MS  Clinical Cardiac Electrophysiology  4/28/2019  11:41 AM

## 2019-04-28 NOTE — PROGRESS NOTES
[facility] CARDIOLOGY PROGRESS NOTE 
      
 
4/28/2019 7:56 AM 
 
Admit Date: 4/27/2019 Admit Diagnosis: Third degree heart block ([address] [I44.2] Subjective: No complaints this AM, no chest pain or shortness of breath Interval History: (History of pertinent interval events obtained from nursing staff) Required temp wire placement yesterday 2/2 pause ROS: 
GEN:  No fever or chills Cardiovascular:  As noted above Pulmonary:  As noted above Neuro:  No new focal motor or sensory loss Objective:  
 
Vitals:  
 04/28/19 3798 04/28/19 0601 04/28/19 0631 04/28/19 0715 BP: 140/77 142/71 142/83 Pulse: 64 63 70 Resp: 14 18 22 Temp:    97.9 °F (36.6 °C) SpO2: 100% 100% 100% Weight:      
Height:      
 
 
Physical Exam: 
Dicie Manning, Well Nourished, No Acute Distress, Alert & Oriented x 3, appropriate mood. Neck- supple, no JVD 
CV- regular rate and paced rhythm, II/VI crescendo decrescendo SM Lung- clear bilaterally Abd- soft, nontender, nondistended Ext- no edema bilaterally. Skin- warm and dry Current Facility-Administered Medications Medication Dose Route Frequency  amLODIPine (NORVASC) tablet 5 mg  5 mg Oral 7am  
 aspirin delayed-release tablet 81 mg  81 mg Oral 7am  
 atorvastatin (LIPITOR) tablet 20 mg  20 mg Oral QHS  losartan (COZAAR) tablet 100 mg  100 mg Oral DAILY  sodium chloride (NS) flush 5-40 mL  5-40 mL IntraVENous Q8H  
 sodium chloride (NS) flush 5-40 mL  5-40 mL IntraVENous PRN  
 ceFAZolin (ANCEF) 2 g/20 mL in sterile water IV syringe  2 g IntraVENous ON CALL TO OR  
 DOPamine (INTROPIN) 800 mg in dextrose 5% 250 mL infusion  0-20 mcg/kg/min IntraVENous TITRATE  heparin (PF) 2 units/ml in NS infusion  3 mL/hr IntraVENous CONTINUOUS  
 fentaNYL citrate (PF) injection  mcg   mcg IntraVENous Multiple  midazolam (VERSED) injection 0.5-5 mg  0.5-5 mg IntraVENous Multiple  0.9% sodium chloride infusion  75 mL/hr IntraVENous CONTINUOUS  
 sodium chloride (NS) flush 5-40 mL  5-40 mL IntraVENous Q8H  
 sodium chloride (NS) flush 5-40 mL  5-40 mL IntraVENous PRN  
 acetaminophen (TYLENOL) tablet 650 mg  650 mg Oral Q4H PRN  
 HYDROcodone-acetaminophen (NORCO) 5-325 mg per tablet 1 Tab  1 Tab Oral Q4H PRN  
 morphine injection 1 mg  1 mg IntraVENous Q4H PRN  
 naloxone (NARCAN) injection 0.4 mg  0.4 mg IntraVENous PRN  
 ondansetron (ZOFRAN) injection 4 mg  4 mg IntraVENous ONCE PRN Data Review:  
Recent Results (from the past 24 hour(s)) EKG, 12 LEAD, INITIAL Collection Time: 04/27/19  9:57 AM  
Result Value Ref Range Ventricular Rate 37 BPM  
 Atrial Rate 80 BPM  
 QRS Duration 154 ms Q-T Interval 594 ms QTC Calculation (Bezet) 466 ms Calculated P Axis 86 degrees Calculated R Axis -67 degrees Calculated T Axis -28 degrees Diagnosis Sinus rhythm with A-V dissociation and Idioventricular rhythm Right bundle branch block Left anterior fascicular block 
!!! Bifascicular block !!! Left ventricular hypertrophy with QRS widening Abnormal ECG When compared with ECG of 11-DEC-2014 13:21, 
Idioventricular rhythm has replaced Sinus rhythm Vent. rate has decreased BY  23 BPM 
Confirmed by Genia Coker ([id number]) on 4/27/2019 5:42:26 PM 
  
POC TROPONIN-I Collection Time: 04/27/19 10:04 AM  
Result Value Ref Range Troponin-I (POC) 0 (L) 0.02 - 0.05 ng/ml CBC WITH AUTOMATED DIFF Collection Time: 04/27/19 10:08 AM  
Result Value Ref Range WBC 6.6 4.3 - 11.1 K/uL  
 RBC 5.03 4.23 - 5.6 M/uL  
 HGB 15.4 13.6 - 17.2 g/dL HCT 46.5 41.1 - 50.3 % MCV 92.4 79.6 - 97.8 FL  
 MCH 30.6 26.1 - 32.9 PG  
 MCHC 33.1 31.4 - 35.0 g/dL  
 RDW 12.6 11.9 - 14.6 % PLATELET 620 155 - 918 K/uL MPV 9.9 9.4 - 12.3 FL ABSOLUTE NRBC 0.00 0.0 - 0.2 K/uL  
 DF AUTOMATED NEUTROPHILS 77 43 - 78 % LYMPHOCYTES 13 13 - 44 % MONOCYTES 6 4.0 - 12.0 % EOSINOPHILS 3 0.5 - 7.8 % BASOPHILS 1 0.0 - 2.0 % IMMATURE GRANULOCYTES 1 0.0 - 5.0 %  
 ABS. NEUTROPHILS 5.1 1.7 - 8.2 K/UL  
 ABS. LYMPHOCYTES 0.8 0.5 - 4.6 K/UL  
 ABS. MONOCYTES 0.4 0.1 - 1.3 K/UL  
 ABS. EOSINOPHILS 0.2 0.0 - 0.8 K/UL  
 ABS. BASOPHILS 0.0 0.0 - 0.2 K/UL  
 ABS. IMM. GRANS. 0.0 0.0 - 0.5 K/UL METABOLIC PANEL, COMPREHENSIVE Collection Time: 04/27/19 10:08 AM  
Result Value Ref Range Sodium 142 136 - 145 mmol/L Potassium 4.3 3.5 - 5.1 mmol/L Chloride 108 (H) 98 - 107 mmol/L  
 CO2 27 21 - 32 mmol/L Anion gap 7 7 - 16 mmol/L Glucose 142 (H) 65 - 100 mg/dL BUN 17 8 - 23 MG/DL Creatinine 0.93 0.8 - 1.5 MG/DL  
 GFR est AA >60 >60 ml/min/1.73m2 GFR est non-AA >60 >60 ml/min/1.73m2 Calcium 8.6 8.3 - 10.4 MG/DL Bilirubin, total 0.7 0.2 - 1.1 MG/DL  
 ALT (SGPT) 34 12 - 65 U/L  
 AST (SGOT) 22 15 - 37 U/L Alk. phosphatase 70 50 - 136 U/L Protein, total 6.9 6.3 - 8.2 g/dL Albumin 3.8 3.2 - 4.6 g/dL Globulin 3.1 2.3 - 3.5 g/dL A-G Ratio 1.2 1.2 - 3.5 PROTHROMBIN TIME + INR Collection Time: 04/27/19 10:08 AM  
Result Value Ref Range Prothrombin time 13.7 11.7 - 14.5 sec INR 1.1 MAGNESIUM Collection Time: 04/27/19 10:08 AM  
Result Value Ref Range Magnesium 2.2 1.8 - 2.4 mg/dL EKG, 12 LEAD, INITIAL Collection Time: 04/27/19 12:28 PM  
Result Value Ref Range Ventricular Rate 110 BPM  
 Atrial Rate 111 BPM  
 P-R Interval 368 ms QRS Duration 138 ms Q-T Interval 392 ms QTC Calculation (Bezet) 530 ms Calculated P Axis 114 degrees Calculated R Axis -66 degrees Calculated T Axis 62 degrees Diagnosis Sinus tachycardia with 1st degree A-V block Right bundle branch block Left anterior fascicular block 
!!! Bifascicular block !!! Left ventricular hypertrophy with QRS widening and repolarization abnormality Abnormal ECG When compared with ECG of 27-APR-2019 09:57, 
 Vent. rate has increased BY  73 BPM 
Confirmed by Iman Sheikh ([id number]) on 4/27/2019 5:54:44 PM 
  
TROPONIN I Collection Time: 04/27/19  3:12 PM  
Result Value Ref Range Troponin-I, Qt. 0.18 (HH) 0.02 - 6.92 NG/ML  
METABOLIC PANEL, BASIC Collection Time: 04/28/19  4:14 AM  
Result Value Ref Range Sodium 142 136 - 145 mmol/L Potassium 3.6 3.5 - 5.1 mmol/L Chloride 108 (H) 98 - 107 mmol/L  
 CO2 28 21 - 32 mmol/L Anion gap 6 (L) 7 - 16 mmol/L Glucose 118 (H) 65 - 100 mg/dL BUN 11 8 - 23 MG/DL Creatinine 0.78 (L) 0.8 - 1.5 MG/DL  
 GFR est AA >60 >60 ml/min/1.73m2 GFR est non-AA >60 >60 ml/min/1.73m2 Calcium 8.2 (L) 8.3 - 10.4 MG/DL  
CBC W/O DIFF Collection Time: 04/28/19  4:14 AM  
Result Value Ref Range WBC 9.7 4.3 - 11.1 K/uL  
 RBC 4.92 4.23 - 5.6 M/uL  
 HGB 15.3 13.6 - 17.2 g/dL HCT 44.8 41.1 - 50.3 % MCV 91.1 79.6 - 97.8 FL  
 MCH 31.1 26.1 - 32.9 PG  
 MCHC 34.2 31.4 - 35.0 g/dL  
 RDW 12.5 11.9 - 14.6 % PLATELET 912 439 - 656 K/uL MPV 9.6 9.4 - 12.3 FL ABSOLUTE NRBC 0.00 0.0 - 0.2 K/uL EKG:  (EKG has been independently visualized by me with interpretation below) Assessment:  
 
Principal Problem: Third degree heart block ([facility] (4/27/2019) Active Problems: Hypertension () High cholesterol () Prostate cancer ([facility] (4/1/2013) Overview: External Beam and brachytherapy Carotid stenosis (12/15/2014) Overview: 12/15/14 (Dr Karthik Worthington) R CEA for 80% stenosis Plan: 1. Complete heart block, uncontrolled: now dependent upon temp pacing, will require PPM. I had a discussion today with the patient regarding the risks, benefits, and alternatives of an implantable cardiac rhythm device. I discussed in detail the potential benefits of pacemaker therapy that are largely aimed at improving symptoms from symptomatic bradycardia.  Additionally, I discussed with the patient the potential risks device implantation, including the risk of bleeding, infection, large blood vessel injury, lung or cardiac injury, venous occlusion, DVT/PE, pneumothorax, cardiac tamponade, perforation, need for urgent open heart surgery or additional procedures, device/lead failure, lead dislodgement, heart attack, stroke, arrhythmia, oversedation, respiratory arrest, and even death. After our comprehensive discussion, the patient would like to proceed. --dual chamber PPM, Biotronik 
                 --normal EF on echo yesterday Hughes Boas Sellers MD 
Cardiology/Electrophysiology

## 2019-04-28 NOTE — PROGRESS NOTES
TRANSFER - OUT REPORT: 
 
Verbal report given to DENIS Alvarado on Evelyn [employer]  being transferred to [facility] (unit) for ordered procedure Report consisted of patients Situation, Background, Assessment and  
Recommendations(SBAR). Information from the following report(s) SBAR, Kardex, Intake/Output, MAR, Recent Results, Med Rec Status and Cardiac Rhythm NSR was reviewed with the receiving nurse. Lines:  
Peripheral IV 04/27/19 Right Antecubital (Active) Site Assessment Clean, dry, & intact 4/27/2019  8:02 PM  
Phlebitis Assessment 0 4/27/2019  8:02 PM  
Infiltration Assessment 0 4/27/2019  8:02 PM  
Dressing Status Clean, dry, & intact 4/27/2019  8:02 PM  
Dressing Type Tape;Transparent 4/27/2019  8:02 PM  
Hub Color/Line Status Pink;Flushed;Patent 4/27/2019  8:02 PM  
   
Peripheral IV 04/27/19 Left Wrist (Active) Site Assessment Clean, dry, & intact 4/27/2019  8:02 PM  
Phlebitis Assessment 0 4/27/2019  8:02 PM  
Infiltration Assessment 0 4/27/2019  8:02 PM  
Dressing Status Clean, dry, & intact 4/27/2019  8:02 PM  
Dressing Type Tape;Transparent 4/27/2019  8:02 PM  
Hub Color/Line Status Green;Flushed;Patent 4/27/2019  8:02 PM  
  
 
Opportunity for questions and clarification was provided. Patient transported with: 
 Monitor O2 @ 2 liters Registered Nurse

## 2019-04-29 VITALS
BODY MASS INDEX: 24.57 KG/M2 | HEART RATE: 82 BPM | HEIGHT: 70 IN | RESPIRATION RATE: 20 BRPM | SYSTOLIC BLOOD PRESSURE: 124 MMHG | DIASTOLIC BLOOD PRESSURE: 73 MMHG | WEIGHT: 171.6 LBS | OXYGEN SATURATION: 96 % | TEMPERATURE: 97.5 F

## 2019-04-29 PROCEDURE — 74011250637 HC RX REV CODE- 250/637: Performed by: NURSE PRACTITIONER

## 2019-04-29 PROCEDURE — 93005 ELECTROCARDIOGRAM TRACING: CPT

## 2019-04-29 PROCEDURE — 74011250637 HC RX REV CODE- 250/637: Performed by: INTERNAL MEDICINE

## 2019-04-29 PROCEDURE — 74011250636 HC RX REV CODE- 250/636: Performed by: INTERNAL MEDICINE

## 2019-04-29 RX ORDER — HYDROCODONE BITARTRATE AND ACETAMINOPHEN 5; 325 MG/1; MG/1
1 TABLET ORAL
Qty: 10 TAB | Refills: 0 | Status: SHIPPED | OUTPATIENT
Start: 2019-04-29 | End: 2019-05-02

## 2019-04-29 RX ADMIN — HYDROCODONE BITARTRATE AND ACETAMINOPHEN 1 TABLET: 5; 325 TABLET ORAL at 01:08

## 2019-04-29 RX ADMIN — Medication 10 ML: at 06:13

## 2019-04-29 RX ADMIN — ASPIRIN 81 MG: 81 TABLET, COATED ORAL at 06:18

## 2019-04-29 RX ADMIN — AMLODIPINE BESYLATE 5 MG: 10 TABLET ORAL at 06:18

## 2019-04-29 RX ADMIN — Medication 2 G: at 01:56

## 2019-04-29 NOTE — PROGRESS NOTES
Discharge instructions reviewed with pt. Prescriptions given for norco and med info sheets provided for all new medications. Opportunity for questions provided. pt voiced understanding of all discharge instructions.   
Iv and monitor removed by primary RN

## 2019-04-29 NOTE — PROGRESS NOTES
Problem: Pacer/ICD: Post-Procedure Goal: *Hemodynamically stable Outcome: Resolved/Met Note:  
VSS Goal: *Optimal pain control at patient's stated goal 
Outcome: Resolved/Met Note: No complaints of pain Goal: *Absence of signs and symptoms of infection or wound complication Outcome: Resolved/Met Note: S/p left subclavian pacer site - dressing c/d/I, no bleeding or hematoma noted

## 2019-04-29 NOTE — PROGRESS NOTES
Bedside and Verbal shift change report given to [facility], [facility], RN (oncoming nurse) by self (offgoing nurse). Report included the following information SBAR, Kardex, MAR and Recent Results. S/p left subclavian pacer site - dressing c/d/i, no bleeding or hematoma noted

## 2019-04-29 NOTE — DISCHARGE SUMMARY
[address] [place] [address] 121 Cardiology Discharge Summary     Patient ID:  Cipriano Corona  [id number]  84 y.o.  1945    Admit date: 4/27/2019    Discharge date:  4/29/2019    Admitting Physician: Ten Joya MD     Discharge Physician: Dr. Helene Johnson    Admission Diagnoses: Third degree heart block ([address] [I44.2]    Discharge Diagnoses:    Diagnosis    Third degree heart block     PVC (premature ventricular contraction)    Mild aortic stenosis    Carotid stenosis    Hypertension    High cholesterol    Prostate cancer       Cardiology Procedures this admission:  Placement of Biotronik DC PM and device interrogation post procedure. Consults: None    Hospital Course: Patient presented to the ED after a syncopal event. He was found to have complete HB. He was taken to the cath lab by Dr. Sol Bentley for [address] (non-obstructive disease) and temporary pacer placement. Post-procedure he was admitted to the CCU for further management. He remained stable and was subsequently taken [place] [facility] [place]. Patient was taken to the cath lab and underwent successful implantation of a Biotronik DC PM by Dr. Helene Johnson. Patient tolerated the procedure well and was taken to the telemetry floor for recovery. Follow up chest xray showed no pneumothorax. The following morning patient was up feeling well without any complaints of chest pain, shortness of breath, or palpitations. The patient's device was interrogated prior to d/c showing normal function. Patient's left subclavian site was clean, dry and intact without hematoma. Patient's labs were stable. Patient was seen and examined by Dr. Helene Johnson and determined stable and ready for discharge. His R femoral venous pacer site and R radial cath site were both without any hematoma. Patient was instructed on the importance of medication compliance and outpatient follow up.  The patient will follow up with [address] [place] [address] 121 Cardiology in [place] office for device/site check on 5/10/19 at 1130 am.       DISPOSITION: Patient has been instructed to keep affected arm below shoulder level for the next 4 weeks or until cleared by doctor. The arm sling should be worn while sleeping. The dressing will be removed at follow up appointment. The incision site must be kept clean and dry. The patient may shower in a few days. Lotions, powders, or creams should be avoided as these can increase the risk of infection. The affected arm should not be used for any pushing, pulling, or lifting until cleared by doctor. Driving is prohibited until cleared by doctor in a follow up appointment. Any signs of infection including increased redness, suspicious drainage, or unexplained fever should be reported to the doctor immediately by calling [phone]. Discharge Exam:   Visit Vitals  /83 (BP 1 Location: Right arm, BP Patient Position: At rest)   Pulse 73   Temp 99.2 °F (37.3 °C)   Resp 18   Ht 5' 10\" (1.778 m)   Wt 81.6 kg (180 lb)   SpO2 98%   BMI 25.83 kg/m²       Pt has been seen by Dr. Miguel Peña: see his progress note for exam details.     Recent Results (from the past 24 hour(s))   METABOLIC PANEL, BASIC    Collection Time: 04/28/19  4:14 AM   Result Value Ref Range    Sodium 142 136 - 145 mmol/L    Potassium 3.6 3.5 - 5.1 mmol/L    Chloride 108 (H) 98 - 107 mmol/L    CO2 28 21 - 32 mmol/L    Anion gap 6 (L) 7 - 16 mmol/L    Glucose 118 (H) 65 - 100 mg/dL    BUN 11 8 - 23 MG/DL    Creatinine 0.78 (L) 0.8 - 1.5 MG/DL    GFR est AA >60 >60 ml/min/1.73m2    GFR est non-AA >60 >60 ml/min/1.73m2    Calcium 8.2 (L) 8.3 - 10.4 MG/DL   CBC W/O DIFF    Collection Time: 04/28/19  4:14 AM   Result Value Ref Range    WBC 9.7 4.3 - 11.1 K/uL    RBC 4.92 4.23 - 5.6 M/uL    HGB 15.3 13.6 - 17.2 g/dL    HCT 44.8 41.1 - 50.3 %    MCV 91.1 79.6 - 97.8 FL    MCH 31.1 26.1 - 32.9 PG    MCHC 34.2 31.4 - 35.0 g/dL    RDW 12.5 11.9 - 14.6 %    PLATELET 275 936 - 769 K/uL    MPV 9.6 9.4 - 12.3 FL    ABSOLUTE NRBC 0.00 0.0 - 0.2 K/uL   EKG, 12 LEAD, INITIAL    Collection Time: 04/28/19 12:58 PM   Result Value Ref Range    Ventricular Rate 62 BPM    Atrial Rate 62 BPM    P-R Interval 250 ms    QRS Duration 148 ms    Q-T Interval 434 ms    QTC Calculation (Bezet) 440 ms    Calculated P Axis 91 degrees    Calculated R Axis -78 degrees    Calculated T Axis 32 degrees    Diagnosis       Atrial-paced rhythm with prolonged AV conduction  Right bundle branch block  Left anterior fascicular block  !!! Bifascicular block !!!  Voltage criteria for left ventricular hypertrophy  When compared with ECG of 27-APR-2019 12:28,  Electronic atrial pacemaker now present  Vent. rate has decreased BY  48 BPM  T wave amplitude has decreased in Inferior leads  Confirmed by RODO WINKLER ([id number]) on 4/28/2019 4:16:32 PM           Patient Instructions:     Current Discharge Medication List      START taking these medications    Details   HYDROcodone-acetaminophen (NORCO) 5-325 mg per tablet Take 1 Tab by mouth every four (4) hours as needed for Pain for up to 3 days. Max Daily Amount: 6 Tabs. Qty: 10 Tab, Refills: 0    Associated Diagnoses: Syncope and collapse         CONTINUE these medications which have NOT CHANGED    Details   atorvastatin (LIPITOR) 20 mg tablet Take 1 Tab by mouth nightly. Qty: 90 Tab, Refills: 1    Associated Diagnoses: High cholesterol      amLODIPine (NORVASC) 5 mg tablet Take 1 Tab by mouth every morning. Indications: high blood pressure  Qty: 90 Tab, Refills: 1    Associated Diagnoses: Essential hypertension      losartan (COZAAR) 100 mg tablet Take 1 Tab by mouth daily. Qty: 90 Tab, Refills: 1    Associated Diagnoses: Essential hypertension      aspirin delayed-release 81 mg tablet Take 81 mg by mouth every morning.  Indications: MYOCARDIAL INFARCTION PREVENTION, continue

## 2019-04-29 NOTE — PROGRESS NOTES
Verbal bedside report received from [place], [address] [facility]. Assumed care of patient. L. Subclavian site c/d/i with aquacel.

## 2019-04-29 NOTE — PROGRESS NOTES
Care Management Interventions PCP Verified by CM: Yes 
Palliative Care Criteria Met (RRAT>21 & CHF Dx)?: No(RRAT 5 Dx Heart block) Mode of Transport at Discharge: Other (see comment)(family) Transition of Care Consult (CM Consult): Discharge Planning Discharge Durable Medical Equipment: No(none) Physical Therapy Consult: No 
Occupational Therapy Consult: No 
Speech Therapy Consult: No 
Current Support Network: Lives with Spouse Confirm Follow Up Transport: Self Plan discussed with Pt/Family/Caregiver: Yes Freedom of Choice Offered: Yes Discharge Location Discharge Placement: Home Met with patient for d/c planning. Patient alert and oriented x 3, independent of ADL's and lives with his wife. He requires no DME and is able to drive. He obtains his medications without difficulty at [facility] on [address] in [address] [place] [address]. He has Medicare and [facility]. He voices no concerns or needs for d/c today. Patient to d/c home with family.

## 2019-04-29 NOTE — DISCHARGE INSTRUCTIONS
Patient Education        Pacemaker Placement: What to Expect at Home  Your Recovery    Pacemaker placement is surgery to put a pacemaker in your chest. This surgery may be done if you have bradycardia (a slow heart rate). A pacemaker is a small, battery-powered device. It sends electrical signals to the heart. These signals work to keep the heartbeat steady. Thin wires, called leads, carry the signals from the pacemaker to the heart. A pacemaker can prevent or reduce dizziness, fainting, and shortness of breath caused by a slow or unsteady heartbeat. Your chest may be sore where the doctor made the cut (incision) and put in the pacemaker. You also may have a bruise and mild swelling. These symptoms usually get better in 1 to 2 weeks. You may feel a hard ridge along the incision. This usually gets softer in the months after surgery. You may be able to see or feel the outline of the pacemaker under your skin. You will probably be able to go back to work or your usual routine 1 to 2 weeks after surgery. Pacemaker batteries usually last 5 to 15 years. Your doctor will talk to you about how often you will need to have your pacemaker checked. You can safely use most household and office electronics. This includes things such as kitchen appliances, electric power tools, and computers. You will need to stay away from things with strong magnetic and electrical fields. This includes things such as an MRI machine (unless your pacemaker is safe for an MRI), welding equipment, and power generators. You can use a cell phone, but keep it at least 6 inches away from your pacemaker. Check with your doctor about what you need to stay away from, what you need to use with care, and what is okay to use. This care sheet gives you a general idea about how long it will take for you to recover. But each person recovers at a different pace. Follow the steps below to get better as quickly as possible.   How can you care for yourself at home?  Activity    · Rest when you feel tired.     · Be active. Walking is a good choice.     · For 4 to 6 weeks:  ? Avoid activities that strain your chest or upper arm muscles. This includes pushing a  or vacuum, or mopping floors. It also includes swimming, or swinging a golf club or tennis racquet. ? Do not raise your arm (the one on the side of your body where the pacemaker is located) above your shoulder. ? Allow your body to heal. Don't move quickly or lift anything heavy until you are feeling better.     · Many people are able to return to work within 1 to 2 weeks after surgery.     · Ask your doctor when it is okay for you to have sex. Diet    · You can eat your normal diet. If your stomach is upset, try bland, low-fat foods like plain rice, broiled chicken, toast, and yogurt. Medicines    · Your doctor will tell you if and when you can restart your medicines. He or she will also give you instructions about taking any new medicines.     · If you take aspirin or some other blood thinner, be sure to talk to your doctor. He or she will tell you if and when to start taking this medicine again. Make sure that you understand exactly what your doctor wants you to do.     · Be safe with medicines. Read and follow all instructions on the label. ? If the doctor gave you a prescription medicine for pain, take it as prescribed. ? If you are not taking a prescription pain medicine, ask your doctor if you can take an over-the-counter medicine. ? Do not take aspirin, ibuprofen (Advil, Motrin), naproxen (Aleve), or other nonsteroidal anti-inflammatory drugs (NSAIDs) unless your doctor says it is okay.     · If your doctor prescribed antibiotics, take them as directed. Do not stop taking them just because you feel better. You need to take the full course of antibiotics.    Incision care    · If you have strips of tape on the incision, leave the tape on for a week or until it falls off.     · Keep the incision dry while it heals. Your doctor may recommend sponge baths for about 7 days, but do not get the incision wet. Your doctor will let you know when you may take showers. After a shower, pat the incision dry.     · Don't use hydrogen peroxide or alcohol on the incision, which can slow healing. You may cover the area with a gauze bandage if it oozes fluid or rubs against clothing. Change the bandage every day.     · Do not take a bath or get into a hot tub for the first 2 weeks, or until your doctor tells you it is okay. Other instructions    · Keep a medical ID card with you at all times that says you have a pacemaker. The card should include the  and model information.     · Wear medical alert jewelry stating that you have a pacemaker. You can buy this at most [employer].     · Check your pulse as directed by your doctor.     · Have your pacemaker checked as often as your doctor recommends. In some cases, this may be done over the phone or the Internet. Your doctor will give you instructions about how to do this. Follow-up care is a key part of your treatment and safety. Be sure to make and go to all appointments, and call your doctor if you are having problems. It's also a good idea to know your test results and keep a list of the medicines you take. When should you call for help? Call 911 anytime you think you may need emergency care.  For example, call if:    · You passed out (lost consciousness).     · You have trouble breathing.    Call your doctor now or seek immediate medical care if:    · You are dizzy or light-headed, or you feel like you may faint.     · You have pain that does not get better after you take pain medicine.     · You hear an alarm or feel a vibration from your pacemaker.     · You have loose stitches, or your incision comes open.     · Bright red blood has soaked through the bandage over your incision.     · You have signs of infection, such as:  ? Increased pain, swelling, warmth, or redness. ? Red streaks leading from the incision. ? Pus draining from the incision. ? A fever.    Watch closely for changes in your health, and be sure to contact your doctor if:    · You have any problems with your pacemaker. Where can you learn more? Go to http://isak-eugene.info/. Enter G550 in the search box to learn more about \"Pacemaker Placement: What to Expect at Home. \"  Current as of: July 22, 2018  Content Version: 11.9  © 3782-1211 [employer]. Care instructions adapted under license by [employer] (which disclaims liability or warranty for this information). If you have questions about a medical condition or this instruction, always ask your healthcare professional. [address] any warranty or liability for your use of this information. Cardiac Catheterization/Angiography Discharge Instructions    *Check the puncture site frequently for swelling or bleeding. If you see any bleeding, lie down and apply pressure over the area with a clean town or washcloth. Notify your doctor for any redness, swelling, drainage or oozing from the puncture site. Notify your doctor for any fever or chills. *If the leg or arm with the puncture becomes cold, numb or painful, call Dr Darren Singleton at  Darren Singleton    *Activity should be limited for the next 48 hours. Climb stairs as little as possible and avoid any stooping, bending or strenuous activity for 48 hours. No heavy lifting (anything over 10 pounds) for three days. *Do not drive for 48 hours. *You may resume your usual diet. Drink more fluids than usual.    *Have a responsible person drive you home and stay with you for at least 24 hours after your heart catheterization/angiography. *You may remove the bandage from your {ARM/GROIN:99445} in 24 hours. You may shower in 24 hours. No tub baths, hot tubs or swimming for one week.  Do not place any lotions, creams, powders, ointments over the puncture site for one week. You may place a clean band-aid over the puncture site each day for 5 days. Change this daily. DISPOSITION: Patient has been instructed to keep affected arm below shoulder level for the next 4 weeks or until cleared by doctor. The arm sling should be worn while sleeping. The dressing will be removed at follow up appointment. The incision site must be kept clean and dry. The patient may shower in a few days. Lotions, powders, or creams should be avoided as these can increase the risk of infection. The affected arm should not be used for any pushing, pulling, or lifting until cleared by doctor. Driving is prohibited until cleared by doctor in a follow up appointment. Any signs of infection including increased redness, suspicious drainage, or unexplained fever should be reported to the doctor immediately by calling [phone].

## 2019-04-29 NOTE — PROGRESS NOTES
[facility] CARDIOLOGY PROGRESS NOTE 
      
 
4/29/2019 6:38 AM 
 
Admit Date: 4/27/2019 Admit Diagnosis: Third degree heart block ([address] [I44.2] Subjective: No complaints this AM, no chest pain or shortness of breath, appropriate post op device pocket pain Interval History: (History of pertinent interval events obtained from nursing staff) Cardiac device placed yesterday, no events overnight, no immediate complications ROS: 
GEN:  No fever or chills Cardiovascular:  As noted above Pulmonary:  As noted above Neuro:  No new focal motor or sensory loss Objective:  
 
Vitals:  
 04/28/19 1706 04/28/19 2035 04/29/19 0105 04/29/19 0543 BP: 118/88 138/83 (!) 159/93 (!) 159/96 Pulse: 70 73 77 76 Resp: 18 18 18 18 Temp: 98 °F (36.7 °C) 99.2 °F (37.3 °C) 98.7 °F (37.1 °C) 97.3 °F (36.3 °C) SpO2: 96% 98% 96% 98% Weight:    77.8 kg (171 lb 9.6 oz) Height:      
 
 
Physical Exam: 
Ulis Grams, Well Nourished, No Acute Distress, Alert & Oriented x 3, appropriate mood. CV- regular rate and rhythm no MRG, left infraclavicular pocket with dressing in place, no evidence of hematoma, redness, warmth or excessive drainage Lung- clear bilaterally Abd- soft, nontender, nondistended Ext- no edema bilaterally. Current Facility-Administered Medications Medication Dose Route Frequency  midazolam (VERSED) injection 0.5-2 mg  0.5-2 mg IntraVENous Multiple  fentaNYL citrate (PF) injection 25-50 mcg  25-50 mcg IntraVENous Multiple  sodium chloride (NS) flush 5-40 mL  5-40 mL IntraVENous Q8H  
 sodium chloride (NS) flush 5-40 mL  5-40 mL IntraVENous PRN  
 morphine injection 2 mg  2 mg IntraVENous Q4H PRN  
 amLODIPine (NORVASC) tablet 5 mg  5 mg Oral 7am  
 aspirin delayed-release tablet 81 mg  81 mg Oral 7am  
 atorvastatin (LIPITOR) tablet 20 mg  20 mg Oral QHS  losartan (COZAAR) tablet 100 mg  100 mg Oral DAILY  sodium chloride (NS) flush 5-40 mL  5-40 mL IntraVENous Q8H  
 sodium chloride (NS) flush 5-40 mL  5-40 mL IntraVENous PRN  
 DOPamine (INTROPIN) 800 mg in dextrose 5% 250 mL infusion  0-20 mcg/kg/min IntraVENous TITRATE  heparin (PF) 2 units/ml in NS infusion  3 mL/hr IntraVENous CONTINUOUS  
 0.9% sodium chloride infusion  75 mL/hr IntraVENous CONTINUOUS  
 sodium chloride (NS) flush 5-40 mL  5-40 mL IntraVENous Q8H  
 sodium chloride (NS) flush 5-40 mL  5-40 mL IntraVENous PRN  
 acetaminophen (TYLENOL) tablet 650 mg  650 mg Oral Q4H PRN  
 HYDROcodone-acetaminophen (NORCO) 5-325 mg per tablet 1 Tab  1 Tab Oral Q4H PRN  
 naloxone (NARCAN) injection 0.4 mg  0.4 mg IntraVENous PRN Data Review:  
Recent Results (from the past 24 hour(s)) EKG, 12 LEAD, INITIAL Collection Time: 04/28/19 12:58 PM  
Result Value Ref Range Ventricular Rate 62 BPM  
 Atrial Rate 62 BPM  
 P-R Interval 250 ms QRS Duration 148 ms Q-T Interval 434 ms QTC Calculation (Bezet) 440 ms Calculated P Axis 91 degrees Calculated R Axis -78 degrees Calculated T Axis 32 degrees Diagnosis Atrial-paced rhythm with prolonged AV conduction Right bundle branch block Left anterior fascicular block 
!!! Bifascicular block !!! 
Voltage criteria for left ventricular hypertrophy When compared with ECG of 27-APR-2019 12:28, Electronic atrial pacemaker now present Vent. rate has decreased BY  48 BPM 
T wave amplitude has decreased in Inferior leads Confirmed by Gotti Mei ([id number]) on 4/28/2019 4:16:32 PM 
  
 
 
EKG:  (EKG has been independently visualized by me with interpretation below) Assessment:  
 
Principal Problem: Third degree heart block ([address] (4/27/2019) Active Problems: Hypertension () High cholesterol () Prostate cancer ([address] (4/1/2013) Overview: External Beam and brachytherapy Carotid stenosis (12/15/2014) Overview: 12/15/14 (Dr Fish Perez) R CEA for 80% stenosis Plan: 1. Cardiac device implantation: Pt device interrogation this AM reveals normal function, excellent pacing and sensing parameters, CXR without pneumothorax with excellent device position, no recognized complications, stable for discharge home today with appropriate follow up. 2. CHB: now s/p device implantation as noted above 3. Dispo: home today with device follow up Malena Bobby MD 
Cardiology/Electrophysiology

## 2019-04-30 ENCOUNTER — PATIENT OUTREACH (OUTPATIENT)
Dept: CASE MANAGEMENT | Age: 74
End: 2019-04-30

## 2019-04-30 NOTE — PROGRESS NOTES
This note will not be viewable in [address] 1st Attempt to contact patient for BRIDGETT call, no answer, left  for returned call. Will attempt to contact patient again within 24 hours

## 2019-05-01 ENCOUNTER — PATIENT OUTREACH (OUTPATIENT)
Dept: CASE MANAGEMENT | Age: 74
End: 2019-05-01

## 2019-05-01 NOTE — PROGRESS NOTES
This note will not be viewable in [address] 2nd attempt to contact patient for [address] call, no answer, left  for returned call. Will attempt 3rd call within 5 business days.

## 2019-05-02 ENCOUNTER — PATIENT OUTREACH (OUTPATIENT)
Dept: CASE MANAGEMENT | Age: 74
End: 2019-05-02

## 2019-05-02 NOTE — PROGRESS NOTES
This note will not be viewable in [address] Date/Time of Call: 05/02/19 353pm  
What was the patient hospitalized for? Third Degree Heart Block Consent for JARON CALDERÓN Call Does the patient understand his/her diagnosis and/or treatment and what happened during the hospitalization? called and spoke with patient; he states that he is doing fine today and is getting better all the time 
yes Did the patient receive discharge instructions? Yes  
CM Assessed Risk for Readmission:  
 
 
Patient stated Risk for Readmission:  Patient is a low to moderate to high risk for readmission No concerns voiced at this time Review any discharge instructions (see discharge instructions/AVS in [facility]). Ask patient if they understand these. Do they have any questions? reviewed DC instructions Were home services ordered (nursing, PT, OT, ST, etc.)? No   
If so, has the first visit occurred? If not, why? (Assist with coordination of services if necessary. ) 
 NA Was any DME ordered? No  
If so, has it been received? If not, why?  (Assist patient in obtaining DME orders &/or equipment if necessary. ) NA Complete a review of all medications (new, continued and discontinued meds per the D/C instructions and medication tab in [address] [place] [address]). Medications reviewed START taking: 
HYDROcodone-acetaminophen 5-325 mg per tablet ([address]) Were all new prescriptions filled? If not, why?  (Assist patient in obtaining medications if necessary  escalate for CCM &/or SW if ongoing issues are verbalized by pt or anticipated) Yes Does the patient understand the purpose and dosing instructions for all medications? (If patient has questions, provide explanation and education.) 
 patient verbalizes understanding of medications Does the patient have any problems in performing ADLs? (If patient is unable to perform ADLs  what is the limiting factor(s)?   Do they have a support system that can assist? If no support system is present, discuss possible assistance that they may be able to obtain. Escalate for CCM/SW if ongoing issues are verbalized by pt or anticipated) Patient is independent with ADLs Does the patient have all follow-up appointments scheduled? 7 day f/up with PCP?  
(f/up with PCP may be w/in 14 days if patient has a f/up with their specialist w/in 7 days) 7-14 day f/up with specialist?  
(or per discharge instructions) If f/up has not been made  what actions has the care coordinator made to accomplish this? Has transportation been arranged? yes 05/06/19 at 2pm 
 
 
 
Cardiology 05/10/119 at 1130am 
 
 
Reviewed appointment information with patient Independent/ spouse; no concerns Any other questions or concerns expressed by the patient? No questions or concerns are voiced at this time. Care Coordinator contact information provided should any needs arise. Schedule next appointment with JARON Briggs or refer to RN Case Manager/ per the workflow guidelines. When is care coordinators next follow-up call scheduled? If referred for CCM  what RN care manager was the referral assigned? Within 30 days Within 30 days NA   
BRIDGETT Call Completed By: Manoj Castellon CMA Care Coordinator

## 2019-05-23 ENCOUNTER — PATIENT OUTREACH (OUTPATIENT)
Dept: CASE MANAGEMENT | Age: 74
End: 2019-05-23

## 2019-05-23 NOTE — PROGRESS NOTES
This note will not be viewable in [address] Attempted contacting patient for 30 day FU. No answer, left  for returned call. Per CC patient has completed FU's with PCP and Cardiology with FU's scheduled appropriately. No further BRIDGETT outreach will be made as patient is following given POC. Episode closed.

## 2019-08-09 PROBLEM — Z95.0 PACEMAKER: Status: ACTIVE | Noted: 2019-08-09

## 2019-08-28 PROBLEM — Z98.890 H/O CAROTID ENDARTERECTOMY: Status: ACTIVE | Noted: 2019-08-28

## 2019-10-02 PROBLEM — I48.92 ATRIAL FLUTTER (HCC): Status: ACTIVE | Noted: 2019-10-02

## 2019-11-11 ENCOUNTER — HOSPITAL ENCOUNTER (OUTPATIENT)
Dept: LAB | Age: 74
Discharge: HOME | End: 2019-11-11
Payer: MEDICARE

## 2019-11-11 DIAGNOSIS — I48.92 ATRIAL FLUTTER, UNSPECIFIED TYPE (HCC): ICD-10-CM

## 2019-11-11 LAB
ANION GAP SERPL CALC-SCNC: 5 MMOL/L (ref 7–16)
BASOPHILS # BLD: 0 K/UL (ref 0–0.2)
BASOPHILS NFR BLD: 0 % (ref 0–2)
BUN SERPL-MCNC: 16 MG/DL (ref 8–23)
CALCIUM SERPL-MCNC: 9.4 MG/DL (ref 8.3–10.4)
CHLORIDE SERPL-SCNC: 106 MMOL/L (ref 98–107)
CO2 SERPL-SCNC: 32 MMOL/L (ref 21–32)
CREAT SERPL-MCNC: 1 MG/DL (ref 0.8–1.5)
DIFFERENTIAL METHOD BLD: ABNORMAL
EOSINOPHIL # BLD: 0.3 K/UL (ref 0–0.8)
EOSINOPHIL NFR BLD: 4 % (ref 0.5–7.8)
ERYTHROCYTE [DISTWIDTH] IN BLOOD BY AUTOMATED COUNT: 13 % (ref 11.9–14.6)
GLUCOSE SERPL-MCNC: 102 MG/DL (ref 65–100)
HCT VFR BLD AUTO: 45.8 % (ref 41.1–50.3)
HGB BLD-MCNC: 15.4 G/DL (ref 13.6–17.2)
IMM GRANULOCYTES # BLD AUTO: 0 K/UL (ref 0–0.5)
IMM GRANULOCYTES NFR BLD AUTO: 0 % (ref 0–5)
LYMPHOCYTES # BLD: 1.5 K/UL (ref 0.5–4.6)
LYMPHOCYTES NFR BLD: 22 % (ref 13–44)
MAGNESIUM SERPL-MCNC: 2.1 MG/DL (ref 1.8–2.4)
MCH RBC QN AUTO: 30.8 PG (ref 26.1–32.9)
MCHC RBC AUTO-ENTMCNC: 33.6 G/DL (ref 31.4–35)
MCV RBC AUTO: 91.6 FL (ref 79.6–97.8)
MONOCYTES # BLD: 0.5 K/UL (ref 0.1–1.3)
MONOCYTES NFR BLD: 7 % (ref 4–12)
NEUTS SEG # BLD: 4.7 K/UL (ref 1.7–8.2)
NEUTS SEG NFR BLD: 67 % (ref 43–78)
NRBC # BLD: 0 K/UL (ref 0–0.2)
PLATELET # BLD AUTO: 184 K/UL (ref 150–450)
PMV BLD AUTO: 9.1 FL (ref 9.4–12.3)
POTASSIUM SERPL-SCNC: 4.2 MMOL/L (ref 3.5–5.1)
RBC # BLD AUTO: 5 M/UL (ref 4.23–5.6)
SODIUM SERPL-SCNC: 143 MMOL/L (ref 136–145)
WBC # BLD AUTO: 7 K/UL (ref 4.3–11.1)

## 2019-11-11 PROCEDURE — 80048 BASIC METABOLIC PNL TOTAL CA: CPT

## 2019-11-11 PROCEDURE — 85025 COMPLETE CBC W/AUTO DIFF WBC: CPT

## 2019-11-11 PROCEDURE — 36415 COLL VENOUS BLD VENIPUNCTURE: CPT

## 2019-11-11 PROCEDURE — 83735 ASSAY OF MAGNESIUM: CPT

## 2019-11-12 ENCOUNTER — ANESTHESIA EVENT (OUTPATIENT)
Dept: SURGERY | Age: 74
End: 2019-11-12
Payer: MEDICARE

## 2019-11-12 RX ORDER — HYDROMORPHONE HYDROCHLORIDE 2 MG/ML
0.5 INJECTION, SOLUTION INTRAMUSCULAR; INTRAVENOUS; SUBCUTANEOUS
Status: CANCELLED | OUTPATIENT
Start: 2019-11-12

## 2019-11-12 RX ORDER — SODIUM CHLORIDE, SODIUM LACTATE, POTASSIUM CHLORIDE, CALCIUM CHLORIDE 600; 310; 30; 20 MG/100ML; MG/100ML; MG/100ML; MG/100ML
75 INJECTION, SOLUTION INTRAVENOUS CONTINUOUS
Status: CANCELLED | OUTPATIENT
Start: 2019-11-12

## 2019-11-12 RX ORDER — OXYCODONE HYDROCHLORIDE 5 MG/1
10 TABLET ORAL
Status: CANCELLED | OUTPATIENT
Start: 2019-11-12 | End: 2019-11-13

## 2019-11-12 RX ORDER — OXYCODONE HYDROCHLORIDE 5 MG/1
5 TABLET ORAL
Status: CANCELLED | OUTPATIENT
Start: 2019-11-12 | End: 2019-11-13

## 2019-11-12 NOTE — PROGRESS NOTES
Patient pre-assessment complete for Atrial flutter ablation with Dr Cammie Rawls scheduled for 19/ at 1pm, arrival time 11am. Patient verified using . Patient instructed to bring all home medications in labeled bottles on the day of procedure. NPO status reinforced. Patient instructed to HOLD xarelto tonight & losartan in the am. Instructed they can take all other medications excluding vitamins & supplements. Patient verbalizes understanding of all instructions & denies any questions at this time.

## 2019-11-13 ENCOUNTER — HOSPITAL ENCOUNTER (OUTPATIENT)
Age: 74
Setting detail: OUTPATIENT SURGERY
Discharge: HOME | End: 2019-11-13
Attending: INTERNAL MEDICINE | Admitting: INTERNAL MEDICINE
Payer: MEDICARE

## 2019-11-13 ENCOUNTER — ANESTHESIA (OUTPATIENT)
Dept: SURGERY | Age: 74
End: 2019-11-13
Payer: MEDICARE

## 2019-11-13 ENCOUNTER — HOSPITAL ENCOUNTER (OUTPATIENT)
Dept: CARDIAC CATH/INVASIVE PROCEDURES | Age: 74
Discharge: HOME | End: 2019-11-13
Payer: MEDICARE

## 2019-11-13 VITALS
OXYGEN SATURATION: 97 % | DIASTOLIC BLOOD PRESSURE: 78 MMHG | RESPIRATION RATE: 13 BRPM | HEART RATE: 67 BPM | WEIGHT: 177 LBS | TEMPERATURE: 98.5 F | SYSTOLIC BLOOD PRESSURE: 135 MMHG | HEIGHT: 71 IN | BODY MASS INDEX: 24.78 KG/M2

## 2019-11-13 LAB
ATRIAL RATE: 62 BPM
CALCULATED P AXIS, ECG09: 90 DEGREES
CALCULATED R AXIS, ECG10: -81 DEGREES
CALCULATED T AXIS, ECG11: 47 DEGREES
DIAGNOSIS, 93000: NORMAL
P-R INTERVAL, ECG05: 272 MS
Q-T INTERVAL, ECG07: 428 MS
QRS DURATION, ECG06: 148 MS
QTC CALCULATION (BEZET), ECG08: 434 MS
VENTRICULAR RATE, ECG03: 62 BPM

## 2019-11-13 PROCEDURE — 74011250637 HC RX REV CODE- 250/637: Performed by: ANESTHESIOLOGY

## 2019-11-13 PROCEDURE — 74011000250 HC RX REV CODE- 250: Performed by: INTERNAL MEDICINE

## 2019-11-13 PROCEDURE — 93662 INTRACARDIAC ECG (ICE): CPT

## 2019-11-13 PROCEDURE — C1732 CATH, EP, DIAG/ABL, 3D/VECT: HCPCS

## 2019-11-13 PROCEDURE — 74011000250 HC RX REV CODE- 250: Performed by: NURSE ANESTHETIST, CERTIFIED REGISTERED

## 2019-11-13 PROCEDURE — 77030035291 HC TBNG PMP SMARTABLATE J&J -B

## 2019-11-13 PROCEDURE — 76060000033 HC ANESTHESIA 1 TO 1.5 HR: Performed by: INTERNAL MEDICINE

## 2019-11-13 PROCEDURE — 93621 COMP EP EVL L PAC&REC C SINS: CPT

## 2019-11-13 PROCEDURE — 77030027107 HC PTCH EXT REF CARTO3 J&J -F

## 2019-11-13 PROCEDURE — 93613 INTRACARDIAC EPHYS 3D MAPG: CPT

## 2019-11-13 PROCEDURE — 93653 COMPRE EP EVAL TX SVT: CPT

## 2019-11-13 PROCEDURE — 77030013687 HC GD NDL BARD -B

## 2019-11-13 PROCEDURE — 74011250636 HC RX REV CODE- 250/636: Performed by: NURSE ANESTHETIST, CERTIFIED REGISTERED

## 2019-11-13 PROCEDURE — C1759 CATH, INTRA ECHOCARDIOGRAPHY: HCPCS

## 2019-11-13 PROCEDURE — 74011250636 HC RX REV CODE- 250/636: Performed by: INTERNAL MEDICINE

## 2019-11-13 PROCEDURE — 93005 ELECTROCARDIOGRAM TRACING: CPT | Performed by: INTERNAL MEDICINE

## 2019-11-13 PROCEDURE — C1894 INTRO/SHEATH, NON-LASER: HCPCS

## 2019-11-13 RX ORDER — HEPARIN SODIUM 200 [USP'U]/100ML
800 INJECTION, SOLUTION INTRAVENOUS CONTINUOUS
Status: DISCONTINUED | OUTPATIENT
Start: 2019-11-13 | End: 2019-11-13 | Stop reason: HOSPADM

## 2019-11-13 RX ORDER — MIDAZOLAM HYDROCHLORIDE 1 MG/ML
2 INJECTION, SOLUTION INTRAMUSCULAR; INTRAVENOUS ONCE
Status: DISCONTINUED | OUTPATIENT
Start: 2019-11-13 | End: 2019-11-13 | Stop reason: HOSPADM

## 2019-11-13 RX ORDER — PROPOFOL 10 MG/ML
INJECTION, EMULSION INTRAVENOUS AS NEEDED
Status: DISCONTINUED | OUTPATIENT
Start: 2019-11-13 | End: 2019-11-13 | Stop reason: HOSPADM

## 2019-11-13 RX ORDER — LIDOCAINE HYDROCHLORIDE 20 MG/ML
INJECTION, SOLUTION EPIDURAL; INFILTRATION; INTRACAUDAL; PERINEURAL AS NEEDED
Status: DISCONTINUED | OUTPATIENT
Start: 2019-11-13 | End: 2019-11-13 | Stop reason: HOSPADM

## 2019-11-13 RX ORDER — LIDOCAINE HYDROCHLORIDE 10 MG/ML
10 INJECTION INFILTRATION; PERINEURAL
Status: DISCONTINUED | OUTPATIENT
Start: 2019-11-13 | End: 2019-11-13 | Stop reason: HOSPADM

## 2019-11-13 RX ORDER — PROPOFOL 10 MG/ML
INJECTION, EMULSION INTRAVENOUS
Status: DISCONTINUED | OUTPATIENT
Start: 2019-11-13 | End: 2019-11-13 | Stop reason: HOSPADM

## 2019-11-13 RX ORDER — LIDOCAINE HYDROCHLORIDE 10 MG/ML
0.1 INJECTION INFILTRATION; PERINEURAL AS NEEDED
Status: DISCONTINUED | OUTPATIENT
Start: 2019-11-13 | End: 2019-11-13 | Stop reason: HOSPADM

## 2019-11-13 RX ORDER — SODIUM CHLORIDE, SODIUM LACTATE, POTASSIUM CHLORIDE, CALCIUM CHLORIDE 600; 310; 30; 20 MG/100ML; MG/100ML; MG/100ML; MG/100ML
75 INJECTION, SOLUTION INTRAVENOUS CONTINUOUS
Status: DISCONTINUED | OUTPATIENT
Start: 2019-11-13 | End: 2019-11-13 | Stop reason: HOSPADM

## 2019-11-13 RX ORDER — SODIUM CHLORIDE, SODIUM LACTATE, POTASSIUM CHLORIDE, CALCIUM CHLORIDE 600; 310; 30; 20 MG/100ML; MG/100ML; MG/100ML; MG/100ML
INJECTION, SOLUTION INTRAVENOUS
Status: DISCONTINUED | OUTPATIENT
Start: 2019-11-13 | End: 2019-11-13 | Stop reason: HOSPADM

## 2019-11-13 RX ORDER — FENTANYL CITRATE 50 UG/ML
100 INJECTION, SOLUTION INTRAMUSCULAR; INTRAVENOUS ONCE
Status: DISCONTINUED | OUTPATIENT
Start: 2019-11-13 | End: 2019-11-13 | Stop reason: HOSPADM

## 2019-11-13 RX ORDER — CEFAZOLIN SODIUM/WATER 2 G/20 ML
SYRINGE (ML) INTRAVENOUS AS NEEDED
Status: DISCONTINUED | OUTPATIENT
Start: 2019-11-13 | End: 2019-11-13 | Stop reason: HOSPADM

## 2019-11-13 RX ORDER — FAMOTIDINE 20 MG/1
20 TABLET, FILM COATED ORAL ONCE
Status: COMPLETED | OUTPATIENT
Start: 2019-11-13 | End: 2019-11-13

## 2019-11-13 RX ADMIN — LIDOCAINE HYDROCHLORIDE 10 ML: 10 INJECTION, SOLUTION INFILTRATION; PERINEURAL at 13:44

## 2019-11-13 RX ADMIN — LIDOCAINE HYDROCHLORIDE 40 MG: 20 INJECTION, SOLUTION EPIDURAL; INFILTRATION; INTRACAUDAL; PERINEURAL at 13:40

## 2019-11-13 RX ADMIN — FAMOTIDINE 20 MG: 20 TABLET ORAL at 12:38

## 2019-11-13 RX ADMIN — PROPOFOL 50 MG: 10 INJECTION, EMULSION INTRAVENOUS at 13:40

## 2019-11-13 RX ADMIN — HEPARIN SODIUM 4000 UNITS/HR: 200 INJECTION, SOLUTION INTRAVENOUS at 13:42

## 2019-11-13 RX ADMIN — SODIUM CHLORIDE, SODIUM LACTATE, POTASSIUM CHLORIDE, AND CALCIUM CHLORIDE: 600; 310; 30; 20 INJECTION, SOLUTION INTRAVENOUS at 13:30

## 2019-11-13 RX ADMIN — Medication 2 G: at 13:46

## 2019-11-13 RX ADMIN — PROPOFOL 120 MCG/KG/MIN: 10 INJECTION, EMULSION INTRAVENOUS at 13:40

## 2019-11-13 NOTE — ANESTHESIA PREPROCEDURE EVALUATION
Relevant Problems   No relevant active problems       Anesthetic History               Review of Systems / Medical History  Patient summary reviewed and pertinent labs reviewed    Pulmonary                   Neuro/Psych              Cardiovascular    Hypertension: well controlled        Dysrhythmias : atrial flutter  CAD and PAD         GI/Hepatic/Renal                Endo/Other             Other Findings              Physical Exam    Airway  Mallampati: II  TM Distance: > 6 cm  Neck ROM: normal range of motion   Mouth opening: Normal     Cardiovascular  Regular rate and rhythm,  S1 and S2 normal,  no murmur, click, rub, or gallop  Rhythm: regular  Rate: normal         Dental  No notable dental hx       Pulmonary  Breath sounds clear to auscultation               Abdominal         Other Findings            Anesthetic Plan    ASA: 3  Anesthesia type: total IV anesthesia            Anesthetic plan and risks discussed with: Patient

## 2019-11-13 NOTE — PROGRESS NOTES
Pt arrived, ambulated to room with no visible problems, planned A flutter Abalation for Dr Radha Baez. Consent signed, Procedure discussed with pt all questions answered voiced understanding. Medications and history discussed with pt. Pt prepped per ordersThe patient has a fraility score of 3-MANAGING WELL, based on ability bailey complete ADLs without assistance, increased symptoms on exertion.

## 2019-11-13 NOTE — PROGRESS NOTES
TRANSFER - IN REPORT:    Verbal report received from [id number] RN(name) on Aron Richter  being received from EP lab(unit) for routine progression of care      Report consisted of patients Situation, Background, Assessment and   Recommendations(SBAR). Information from the following report(s) Procedure Summary was reviewed with the receiving nurse. Opportunity for questions and clarification was provided. Assessment completed upon patients arrival to unit and care assumed.

## 2019-11-13 NOTE — DISCHARGE INSTRUCTIONS
ELECTROPHYSIOLOGY STUDY/ABLATION DISCHARGE INSTRUCTIONS    Your Recovery: You had an electrophysiology study for a problem with your heartbeat. You may also have had a catheter ablation to try to correct the problem. You may have swelling, bruising, or a small lump around the site where the catheters went into your body. These should go away in 3 to 4 weeks. Going home:    · Do not drive for 24 hours  · You will need someone to drive you home  · You will be given more specific instructions about recovering from your procedure, including activity and when you may return to work. · Call your doctor if you have any questions or concerns. After your ablation:    You can expect to feel brief palpitations for up to 3 months. If your palpitations return, please contact [facility] Cardiology [phone]. 1. Check puncture site frequently for swelling or bleeding. If there is any bleeding, lie down and apply pressure over the area with a clean towel or washcloth. Call 911. Notify your doctor for any redness, swelling, drainage, or oozing from the puncture site. Notify your doctor for any fever or chills. 2. If the extremity becomes cold, numb, or painful call [facility] Cardiology at [phone].  3. Activity should be limited for the next 48 hours. Climb stairs as little as possible and avoid any stooping, bending, or strenuous activity for 48 hours. No heavy lifting (anything over 10 pounds) for 3 days. 4. You may resume your usual diet. Drink more fluids than usual.  5. Have a responsible person drive you home and stay with you for at least 24 hours after your heart catheterization/angiography. Do not drive for the next 24 hours. 6. You may remove bandage from your Right groin in 24 hours. You may shower in 24 hours. No tub baths, hot tubs, or swimming for 1 week. Do not place any lotions, creams, powders, or ointments over puncture site for 1 week.  You may place a clean band-aid over the puncture site each day for 5 days. Change daily. I have read the above instructions and have had the opportunity to ask questions.       Patient: ________________________   Date: 11/13/2019    Witness: _______________________   Date: 11/13/2019

## 2019-11-13 NOTE — PROCEDURES
Pre-Electrophysiology Diagnosis  1. Typical atrial flutter. Procedure Performed  1. Electrophysiology testing with right-sided atrial flutter ablation. 2. Left atrial pacing recording from the coronary sinus. 3. 3-D Electroanatomical mapping  4. Intracardiac echocardiogram    Anesthesia: MAC     Estimated Blood Loss: Less than 10 mL     Specimens: * No specimens in log *    Procedure in Detail:  The patient was brought to the electrophysiology lab in the fasting state. A Ref-Star CARTO patch was placed, the patient was then prepped and draped in sterile fashion. Venous access was then obtained x3 using modified Seldinger technique under ultrasound guidance, with placement of 3 short sidearm sheaths into the right femoral vein. A 3.5 mm Biosense Webster porous irrigated Celanese [facility] ablation catheter was inserted into one of the 8F sheaths and was used to create an electroanatomical map of the right atrium focusing on the cavotricuspid isthmus and into the coronary sinus. The ablation catheter was used to record intracardiac electrograms along the lateral wall of the right atrium and the His electrogram.  A multipolar catheter was then inserted via an 8Fr sheath and positioned in the mid coronary sinus. An intracardiac echo probe was inserted and used to visualize and nette the pacemaker leads and visualized the CTI. The patient presented to the EP lab in normal sinus rhythm, with a prior ECG consistent with typical right atrial CTI dependent flutter. Linear ablation across the cavo-tricuspid isthmus was performed starting with 1:2 A:V EGMs along the isthmus at 6pm Amharic to obtain bidirectional block. The local electrogram activation sequence, differential pacing maneuvers and electrogram timing was used to demonstrate bidirectional block along the cavotricuspid isthmus with further ablation.     Trans-isthmus time post ablation: 158 msec    The coronary sinus multipolar catheter was used to pace the left atrium during the EP study. The LA CS electrograms were documented and interpreted during the procedure. A comprehensive EP study was performed with 1:1 AV decremental pacing, atrial extrastimuli and ventricular pacing to assess retrograde conduction. The patient did not have sustained slow pathway conduction or evidence of an accessory pathway. Ventricular pacing revealed retrograde VA conduction which was concentric and decremental.    Baseline Intervals    QRS duration: 136 msec  NE interval: 248 msec  RR interval: 798 msec  AH interval: 89 msec  HV interval: 76 msec    EP Study    AV Wenchebach: 610 msec  AV darrian ERP: > 600 msec  VA Wen[place]bach: 330 msec    At the completion of the final comprehensive EP study, all catheters were removed, and sheaths pulled. The patient tolerated the procedure well with no acute complications recognized. Plan of care: The patient will be placed in observation on telemetry, 4 hour flat time, followed by ambulation as tolerated and will continue anticoagulation as prescribed pre-procedure. Complications: None    Summary:   1. Successful ablation of clockwise RA flutter by creation of a line of bidirectional block at the cavotricuspid isthmus. 2.         Comprehensive EP study . 3. Pt tolerated the procedure well. 4. Family updated. Hannah Bobby MD, MS  Clinical Cardiac Electrophysiology  11/13/2019  2:26 PM

## 2019-11-14 NOTE — ANESTHESIA POSTPROCEDURE EVALUATION
Procedure(s):  AFLUTTER ABLATION. total IV anesthesia    Anesthesia Post Evaluation      Multimodal analgesia: multimodal analgesia not used between 6 hours prior to anesthesia start to PACU discharge  Patient location during evaluation: PACU  Patient participation: complete - patient participated  Level of consciousness: awake and alert  Pain management: adequate  Airway patency: patent  Anesthetic complications: no  Cardiovascular status: hemodynamically stable  Respiratory status: acceptable  Hydration status: acceptable        No vitals data found for the desired time range.

## 2020-11-05 ENCOUNTER — HOSPITAL ENCOUNTER (OUTPATIENT)
Dept: LAB | Age: 75
Discharge: HOME | End: 2020-11-05

## 2020-11-05 PROCEDURE — 88305 TISSUE EXAM BY PATHOLOGIST: CPT

## 2021-09-20 ENCOUNTER — HOSPITAL ENCOUNTER (OUTPATIENT)
Dept: ULTRASOUND IMAGING | Age: 76
Discharge: HOME | End: 2021-09-20
Attending: INTERNAL MEDICINE
Payer: MEDICARE

## 2021-09-20 DIAGNOSIS — Z87.891 FORMER SMOKER, STOPPED SMOKING IN DISTANT PAST: ICD-10-CM

## 2021-09-20 PROCEDURE — 93978 VASCULAR STUDY: CPT

## 2022-01-12 PROBLEM — I48.92 ATRIAL FLUTTER (HCC): Status: RESOLVED | Noted: 2019-10-02 | Resolved: 2022-01-12

## 2022-01-12 PROBLEM — I44.2 THIRD DEGREE HEART BLOCK (HCC): Status: RESOLVED | Noted: 2019-04-27 | Resolved: 2022-01-12

## 2022-03-19 PROBLEM — Z98.890 H/O CAROTID ENDARTERECTOMY: Status: ACTIVE | Noted: 2019-08-28

## 2022-03-20 PROBLEM — Z95.0 PACEMAKER: Status: ACTIVE | Noted: 2019-08-09

## 2022-05-24 NOTE — PROGRESS NOTES
TRANSFER - OUT REPORT: 
 
Verbal report given to RN on Lincoln Barrera  being transferred to CCU for routine progression of care Report consisted of patients Situation, Background, Assessment and Recommendations(SBAR). Information from the following report(s) SBAR, Kardex, Procedure Summary and MAR was reviewed with the receiving nurse. Opportunity for questions and clarification was provided. Dual Chamber PPM with Dr Gladis Avalos DDDR  Left chest incision. Suture, staple, aquacel 4 versed 100 fentanyl Temp pacemaker pulled. 6F RFV sheath pulled on table. Manual pressure held. Site covered with gauze and tegaderm Statement Selected

## 2022-06-09 DIAGNOSIS — I48.0 PAF (PAROXYSMAL ATRIAL FIBRILLATION) (HCC): Primary | ICD-10-CM

## 2022-06-09 NOTE — PROGRESS NOTES
This note will not be viewable in [address] ALERT REMOTE CHECK     Preliminary Impression: Requires physician review due to 58 min AF episode, hx flutter ablation off xarelto since 2019/ ? restart. Following MD: Dr. Key Pearce  Implanting MD: Dr. Joel Koenig had an alert remote noting 58 min AF episode 6/8 at 2300. V rates paced. Hx flutter ablation since 2019 and off AC. The device report was generated from the remote website, and the results were forwarded to the ordering provider for review. Please see Interrogation report for the final results.      Kavitha Mirza RN  6/9/2022  7:09 AM

## 2022-06-20 DIAGNOSIS — I48.0 PAF (PAROXYSMAL ATRIAL FIBRILLATION) (HCC): Primary | ICD-10-CM

## 2022-06-20 NOTE — PROGRESS NOTES
This note will not be viewable in [address] SCHEDULED REMOTE CHECK     Preliminary Impression: Requires physician review due to 10 min AF episode, patient w/ previous AF episodes and DR. Adams advised to restart xarelto/ patient now agreeable/ samples given/ f/u appt. Following MD: Dr. Bernie Lanier  Implanting MD: Dr. Keri Abarca had an alert remote noting 10 min AF episode on 6/19. AC previously discussed, patient now agreeable. Samples given. Bleeding precautions were discussed. Labs and f/u scheduled. The device report was generated from the remote website, and the results were forwarded to the ordering provider for review. Please see Interrogation report for the final results.      Omkar Brooks RN  6/20/2022  3:02 PM

## 2022-07-01 DIAGNOSIS — I48.0 PAF (PAROXYSMAL ATRIAL FIBRILLATION) (HCC): ICD-10-CM

## 2022-07-01 LAB
ANION GAP SERPL CALC-SCNC: 6 MMOL/L (ref 7–16)
BASOPHILS # BLD: 0 K/UL (ref 0–0.2)
BASOPHILS NFR BLD: 1 % (ref 0–2)
BUN SERPL-MCNC: 22 MG/DL (ref 8–23)
CALCIUM SERPL-MCNC: 9.1 MG/DL (ref 8.3–10.4)
CHLORIDE SERPL-SCNC: 107 MMOL/L (ref 98–107)
CO2 SERPL-SCNC: 28 MMOL/L (ref 21–32)
CREAT SERPL-MCNC: 0.9 MG/DL (ref 0.8–1.5)
DIFFERENTIAL METHOD BLD: ABNORMAL
EOSINOPHIL # BLD: 0.3 K/UL (ref 0–0.8)
EOSINOPHIL NFR BLD: 7 % (ref 0.5–7.8)
ERYTHROCYTE [DISTWIDTH] IN BLOOD BY AUTOMATED COUNT: 13.5 % (ref 11.9–14.6)
GLUCOSE SERPL-MCNC: 126 MG/DL (ref 65–100)
HCT VFR BLD AUTO: 39.4 % (ref 41.1–50.3)
HGB BLD-MCNC: 13.4 G/DL (ref 13.6–17.2)
IMM GRANULOCYTES # BLD AUTO: 0 K/UL (ref 0–0.5)
IMM GRANULOCYTES NFR BLD AUTO: 0 % (ref 0–5)
LYMPHOCYTES # BLD: 0.6 K/UL (ref 0.5–4.6)
LYMPHOCYTES NFR BLD: 12 % (ref 13–44)
MCH RBC QN AUTO: 30.8 PG (ref 26.1–32.9)
MCHC RBC AUTO-ENTMCNC: 34 G/DL (ref 31.4–35)
MCV RBC AUTO: 90.6 FL (ref 79.6–97.8)
MONOCYTES # BLD: 0.5 K/UL (ref 0.1–1.3)
MONOCYTES NFR BLD: 10 % (ref 4–12)
NEUTS SEG # BLD: 3.4 K/UL (ref 1.7–8.2)
NEUTS SEG NFR BLD: 70 % (ref 43–78)
NRBC # BLD: 0 K/UL (ref 0–0.2)
PLATELET # BLD AUTO: 167 K/UL (ref 150–450)
PMV BLD AUTO: 9.4 FL (ref 9.4–12.3)
POTASSIUM SERPL-SCNC: 4.3 MMOL/L (ref 3.5–5.1)
RBC # BLD AUTO: 4.35 M/UL (ref 4.23–5.6)
SODIUM SERPL-SCNC: 141 MMOL/L (ref 138–145)
WBC # BLD AUTO: 4.9 K/UL (ref 4.3–11.1)

## 2022-07-27 ENCOUNTER — OFFICE VISIT (OUTPATIENT)
Dept: CARDIOLOGY CLINIC | Age: 77
End: 2022-07-27
Payer: MEDICARE

## 2022-07-27 VITALS
WEIGHT: 174.2 LBS | HEART RATE: 66 BPM | DIASTOLIC BLOOD PRESSURE: 82 MMHG | HEIGHT: 71 IN | SYSTOLIC BLOOD PRESSURE: 138 MMHG | BODY MASS INDEX: 24.39 KG/M2

## 2022-07-27 DIAGNOSIS — I48.0 PAF (PAROXYSMAL ATRIAL FIBRILLATION) (HCC): Primary | ICD-10-CM

## 2022-07-27 PROCEDURE — 99214 OFFICE O/P EST MOD 30 MIN: CPT | Performed by: INTERNAL MEDICINE

## 2022-07-27 PROCEDURE — 93000 ELECTROCARDIOGRAM COMPLETE: CPT | Performed by: INTERNAL MEDICINE

## 2022-07-27 PROCEDURE — 1123F ACP DISCUSS/DSCN MKR DOCD: CPT | Performed by: INTERNAL MEDICINE

## 2022-07-27 RX ORDER — PREDNISONE 1 MG/1
TABLET ORAL
COMMUNITY
Start: 2022-07-22

## 2022-07-27 RX ORDER — ABIRATERONE ACETATE 250 MG/1
TABLET ORAL
COMMUNITY
Start: 2022-06-13

## 2022-07-27 NOTE — PROGRESS NOTES
[address] [place]  [address], [address]  PHONE: [phone]  1945    SUBJECTIVE:   Katerin Angela is a 77 y.o. male seen for a follow up visit regarding the following:     Chief Complaint   Patient presents with    Irregular Heart Beat     fu       HPI:    Katerin Angela is a very pleasant 77 y.o. male with a past medical and cardiac history significant for CHB and now s/p PPM atrial flutter s/p aflutter ablation. He is doing well, no  chest pain, SOB, presyncope, syncope or other concerns. He denies palpitations or sensation of rapid HR. Cardiac PMH: (Old records have been reviewed and summarized below)    Reviewed office note Dr. Aurelia Jeffers 5/13/22    Past Medical History, Past Surgical History, Family history, Social History, and Medications were all reviewed with the patient today and updated as necessary. Current Outpatient Medications   Medication Sig Dispense Refill    abiraterone acetate (ZYTIGA) 250 MG tablet Take 4 tablets (1,000 mg total) by mouth once daily Take on an empty stomach. predniSONE (DELTASONE) 5 MG tablet TAKE 1 TABLET BY MOUTH IN THE MORNING FOR 10 DAYS      rivaroxaban (XARELTO) 20 MG TABS tablet Take 1 tablet by mouth Daily with supper 30 tablet 5    amLODIPine (NORVASC) 5 MG tablet TAKE 1 TABLET BY MOUTH IN THE MORNING FOR HIGH BLOOD PRESSURE      aspirin 81 MG EC tablet Take 81 mg by mouth      atorvastatin (LIPITOR) 20 MG tablet Take 1 tablet by mouth nightly      losartan (COZAAR) 100 MG tablet TAKE 1 TABLET BY MOUTH ONCE DAILY       No current facility-administered medications for this visit.      No Known Allergies  [unfilled]  Past Medical History:   Diagnosis Date    Aortic valve sclerosis     Bradycardia     Bundle branch block          Cancer (HCC)     prostate and lymph nodes    Diverticulitis     High cholesterol     History of basal cell cancer     forehead    History of colonic polyps     History of prostate cancer 2013    External Beam and brachytherapy    Hypertension     Mild aortic stenosis     PVC (premature ventricular contraction)     Varicella      Past Surgical History:   Procedure Laterality Date    CAROTID ENDARTERECTOMY Right 12/15/14    COLONOSCOPY  2012    LYMPH NODE DISSECTION      PACEMAKER  2019     Family History   Problem Relation Age of Onset    Cancer Mother         colon    Heart Disease Mother     Cancer Father         lung    Heart Disease Sister      Social History     Tobacco Use    Smoking status: Former     Packs/day: 0.25     Types: Cigarettes     Quit date: 1976     Years since quittin.6    Smokeless tobacco: Never   Substance Use Topics    Alcohol use: Yes     Alcohol/week: 4.0 - 5.0 standard drinks       PHYSICAL EXAM:    /82   Pulse 66   Ht 5' 11\" (1.803 m)   Wt 174 lb 3.2 oz (79 kg)   BMI 24.30 kg/m²      Wt Readings from Last 5 Encounters:   22 174 lb 3.2 oz (79 kg)   22 174 lb (78.9 kg)   22 175 lb (79.4 kg)   22 177 lb 9.6 oz (80.6 kg)   21 173 lb (78.5 kg)       BP Readings from Last 5 Encounters:   22 138/82   22 134/73   22 131/78   22 (!) 152/90   21 130/78       General appearance: Alert, well appearing, and in no distress   CV: RRR, soft SM, no JVD, normal distal pulses, no lower extremity edema  Pulm: Clear to auscultation, no wheezes, no crackles, no accessory muscle use  GI: Soft, nontender, nondistended  Neuro: Alert and oriented    Medical problems and test results were reviewed with the patient today.      Lab Results   Component Value Date/Time    K 4.3 2022 11:12 AM     Creatinine   Date Value Ref Range Status   2022 0.90 0.8 - 1.5 MG/DL Final     Lab Results   Component Value Date/Time    HGB 13.4 2022 11:12 AM     No results found for: INR, PTMR, PT1    Lab Results   Component Value Date    WBC 4.9 2022    HGB 13.4 (L) 2022    HCT 39.4 (L) 07/01/2022    MCV 90.6 07/01/2022     07/01/2022      Lab Results   Component Value Date/Time     07/01/2022 11:12 AM    K 4.3 07/01/2022 11:12 AM     07/01/2022 11:12 AM    CO2 28 07/01/2022 11:12 AM    BUN 22 07/01/2022 11:12 AM    CREATININE 0.90 07/01/2022 11:12 AM    GLUCOSE 126 07/01/2022 11:12 AM    CALCIUM 9.1 07/01/2022 11:12 AM         EKG: (EKG has been independently visualized by me with interpretation below)  atrial and ventricular pacing  rate 66    Rony Gill was seen today for irregular heart beat. Diagnoses and all orders for this visit:    PAF (paroxysmal atrial fibrillation) (HCC)  -     EKG 12 lead  -     Transthoracic echocardiogram (TTE) complete with contrast, bubble, strain, and 3D PRN; Future      ASSESSMENT and PLAN  1. PPM: functioning normally, cont device checks     2. CHB: stable post PPM, high V pacing, check TTE     3. Atrial flutter, new diagnosis: s/p ablation, now with AF     4. pAF: low burden, largely V paced, asymptomatic, monitor     5. HTN: controlled, cont losartan 100mg, amlodipine 5mg     6. Mild AS: recheck TTE    7. CVA protection: cont xarelto 20mg, no bleeding    Patient has been instructed and agrees to call our office with any issues or other concerns related to their cardiac condition(s) and/or complaint(s). Return in about 1 year (around 7/27/2023). Lauro Potter MD, MS  Cardiology/Electrophysiology  7/27/2022  8:02 AM

## 2022-08-23 ENCOUNTER — TELEPHONE (OUTPATIENT)
Dept: CARDIOLOGY CLINIC | Age: 77
End: 2022-08-23

## 2022-09-14 ENCOUNTER — OFFICE VISIT (OUTPATIENT)
Dept: INTERNAL MEDICINE CLINIC | Facility: CLINIC | Age: 77
End: 2022-09-14
Payer: MEDICARE

## 2022-09-14 VITALS
HEART RATE: 74 BPM | HEIGHT: 71 IN | OXYGEN SATURATION: 98 % | TEMPERATURE: 98.2 F | DIASTOLIC BLOOD PRESSURE: 80 MMHG | BODY MASS INDEX: 24.22 KG/M2 | SYSTOLIC BLOOD PRESSURE: 145 MMHG | WEIGHT: 173 LBS

## 2022-09-14 DIAGNOSIS — I65.29 STENOSIS OF CAROTID ARTERY, UNSPECIFIED LATERALITY: ICD-10-CM

## 2022-09-14 DIAGNOSIS — I10 ESSENTIAL HYPERTENSION: Primary | ICD-10-CM

## 2022-09-14 DIAGNOSIS — Z23 NEED FOR PROPHYLACTIC VACCINATION AND INOCULATION AGAINST CHOLERA ALONE: ICD-10-CM

## 2022-09-14 DIAGNOSIS — E78.49 OTHER HYPERLIPIDEMIA: ICD-10-CM

## 2022-09-14 DIAGNOSIS — I48.92 ATRIAL FLUTTER, UNSPECIFIED TYPE (HCC): ICD-10-CM

## 2022-09-14 DIAGNOSIS — C61 PROSTATE CANCER (HCC): ICD-10-CM

## 2022-09-14 DIAGNOSIS — I35.0 MILD AORTIC STENOSIS: ICD-10-CM

## 2022-09-14 LAB
CHOLEST SERPL-MCNC: 173 MG/DL
HDLC SERPL-MCNC: 76 MG/DL (ref 40–60)
HDLC SERPL: 2.3 {RATIO}
LDLC SERPL CALC-MCNC: 71 MG/DL
TRIGL SERPL-MCNC: 130 MG/DL (ref 35–150)
VLDLC SERPL CALC-MCNC: 26 MG/DL (ref 6–23)

## 2022-09-14 PROCEDURE — 90694 VACC AIIV4 NO PRSRV 0.5ML IM: CPT | Performed by: INTERNAL MEDICINE

## 2022-09-14 PROCEDURE — 1123F ACP DISCUSS/DSCN MKR DOCD: CPT | Performed by: INTERNAL MEDICINE

## 2022-09-14 PROCEDURE — 99214 OFFICE O/P EST MOD 30 MIN: CPT | Performed by: INTERNAL MEDICINE

## 2022-09-14 PROCEDURE — G8427 DOCREV CUR MEDS BY ELIG CLIN: HCPCS | Performed by: INTERNAL MEDICINE

## 2022-09-14 PROCEDURE — G0008 ADMIN INFLUENZA VIRUS VAC: HCPCS | Performed by: INTERNAL MEDICINE

## 2022-09-14 PROCEDURE — G8420 CALC BMI NORM PARAMETERS: HCPCS | Performed by: INTERNAL MEDICINE

## 2022-09-14 PROCEDURE — 3017F COLORECTAL CA SCREEN DOC REV: CPT | Performed by: INTERNAL MEDICINE

## 2022-09-14 PROCEDURE — 1036F TOBACCO NON-USER: CPT | Performed by: INTERNAL MEDICINE

## 2022-09-14 RX ORDER — ATORVASTATIN CALCIUM 20 MG/1
20 TABLET, FILM COATED ORAL DAILY
Qty: 90 TABLET | Refills: 1 | Status: SHIPPED | OUTPATIENT
Start: 2022-09-14

## 2022-09-14 RX ORDER — AMLODIPINE BESYLATE 5 MG/1
5 TABLET ORAL DAILY
Qty: 90 TABLET | Refills: 1 | Status: SHIPPED | OUTPATIENT
Start: 2022-09-14

## 2022-09-14 RX ORDER — LOSARTAN POTASSIUM 100 MG/1
100 TABLET ORAL DAILY
Qty: 90 TABLET | Refills: 1 | Status: SHIPPED | OUTPATIENT
Start: 2022-09-14

## 2022-09-14 SDOH — ECONOMIC STABILITY: FOOD INSECURITY: WITHIN THE PAST 12 MONTHS, THE FOOD YOU BOUGHT JUST DIDN'T LAST AND YOU DIDN'T HAVE MONEY TO GET MORE.: NEVER TRUE

## 2022-09-14 SDOH — ECONOMIC STABILITY: FOOD INSECURITY: WITHIN THE PAST 12 MONTHS, YOU WORRIED THAT YOUR FOOD WOULD RUN OUT BEFORE YOU GOT MONEY TO BUY MORE.: NEVER TRUE

## 2022-09-14 ASSESSMENT — PATIENT HEALTH QUESTIONNAIRE - PHQ9
SUM OF ALL RESPONSES TO PHQ9 QUESTIONS 1 & 2: 0
1. LITTLE INTEREST OR PLEASURE IN DOING THINGS: 0
SUM OF ALL RESPONSES TO PHQ QUESTIONS 1-9: 0
2. FEELING DOWN, DEPRESSED OR HOPELESS: 0
SUM OF ALL RESPONSES TO PHQ QUESTIONS 1-9: 0

## 2022-09-14 ASSESSMENT — ENCOUNTER SYMPTOMS: SHORTNESS OF BREATH: 0

## 2022-09-14 ASSESSMENT — SOCIAL DETERMINANTS OF HEALTH (SDOH): HOW HARD IS IT FOR YOU TO PAY FOR THE VERY BASICS LIKE FOOD, HOUSING, MEDICAL CARE, AND HEATING?: NOT HARD AT ALL

## 2022-09-14 NOTE — PROGRESS NOTES
Genny Rolon M.D. Internal Medicine  [address] [place] [address], [address] [place] [address]  Office : [phone]  Fax : [phone]    Chief Complaint   Patient presents with    Hypertension     4 mo follow up       History of Present Illness:  Servando Escamilla is a 77 y.o. male. Hypertension  Pertinent negatives include no chest pain or shortness of breath. Hypertension  Patient is in for Hypertension which is stable. Diet and Lifestyle: generally follows a low sodium diet  exercises sporadically  nonsmoker  Home BP Monitoring: Reviewed blood pressure diary with patient at todays visit. .Patient's recent blood pressures were   BP Readings from Last 3 Encounters:   09/14/22 (!) 145/80   07/27/22 138/82   05/13/22 134/73   . taking medications as instructed, no medication side effects noted, no TIA's, no chest pain on exertion, no dyspnea on exertion, no swelling of ankles. Cardiac risk factors consist of dyslipidemia, hypertension, and male gender. Hyperlipidemia  Patient is in for follow-up for hyperlipidemia. Diet and Lifestyle: generally follows a low fat low cholesterol diet. Risk factors for vascular disease consist of hyperlipidemia and hypertension. Last LDL was   Lab Results   Component Value Date    LDLCALC 93 09/08/2021   . Last ALT was   Lab Results   Component Value Date/Time    ALT 30 09/08/2021 11:17 AM   .  No muscle aches. Prostate cancer  Has recurred after 5 years from original diagnosis in lymph nodes. Current treatment is Eligard, abiraterone and prednisone. Finished RT  Lab Results   Component Value Date    PSA 0.5 02/12/2021         Carotid Stenosis  Followed by vascular surgery. Had carotid doppler in  March 2022    Afib S/p Atrial Flutter  Had successful RF ablation for aflutter and stopped DOAC. Cardiology is monitoring pacemaker and mild aortic stenosis. Last echo 1/2021.   Back on DOAC likely long term    Past Medical History:  Past Medical History:   Diagnosis Date    Aortic valve sclerosis     Bradycardia     Bundle branch block          Cancer (HCC)     prostate and lymph nodes    Diverticulitis     High cholesterol     History of basal cell cancer     forehead    History of colonic polyps     History of prostate cancer 2013    External Beam and brachytherapy    Hypertension     Mild aortic stenosis     PVC (premature ventricular contraction)     Varicella      Past Surgical History:  Past Surgical History:   Procedure Laterality Date    CAROTID ENDARTERECTOMY Right 12/15/14    COLONOSCOPY  2012    LYMPH NODE DISSECTION      PACEMAKER  2019     Allergies:   No Known Allergies  Medications:   Current Outpatient Medications   Medication Sig Dispense Refill    amLODIPine (NORVASC) 5 MG tablet Take 1 tablet by mouth daily TAKE 1 TABLET BY MOUTH IN THE MORNING FOR HIGH BLOOD PRESSURE 90 tablet 1    atorvastatin (LIPITOR) 20 MG tablet Take 1 tablet by mouth daily Take 1 tablet by mouth nightly 90 tablet 1    losartan (COZAAR) 100 MG tablet Take 1 tablet by mouth daily TAKE 1 TABLET BY MOUTH ONCE DAILY 90 tablet 1    abiraterone acetate (ZYTIGA) 250 MG tablet Take 4 tablets (1,000 mg total) by mouth once daily Take on an empty stomach. predniSONE (DELTASONE) 5 MG tablet TAKE 1 TABLET BY MOUTH IN THE MORNING FOR 10 DAYS      rivaroxaban (XARELTO) 20 MG TABS tablet Take 1 tablet by mouth Daily with supper 30 tablet 5    aspirin 81 MG EC tablet Take 81 mg by mouth (Patient not taking: Reported on 2022)       No current facility-administered medications for this visit. Social History:  Social History     Tobacco Use    Smoking status: Former     Packs/day: 0.25     Years: 15.00     Pack years: 3.75     Types: Cigarettes     Quit date: 1976     Years since quittin.7    Smokeless tobacco: Never   Substance Use Topics    Alcohol use:  Yes     Alcohol/week: 4.0 - 5.0 standard drinks     Family History  Family History   Problem Relation Age of Onset    Cancer Mother         colon    Heart Disease Mother     Cancer Father         lung    Heart Disease Sister        Review of Systems   Constitutional:  Negative for chills, fatigue and fever. Respiratory:  Negative for shortness of breath. Cardiovascular:  Negative for chest pain and leg swelling. Musculoskeletal:  Negative for gait problem. Skin:  Negative for rash. Neurological:  Negative for speech difficulty. Psychiatric/Behavioral:  Negative for dysphoric mood. Vital Signs  BP (!) 145/80 (Site: Left Upper Arm, Position: Sitting)   Pulse 74   Temp 98.2 °F (36.8 °C) (Temporal)   Ht 5' 11\" (1.803 m)   Wt 173 lb (78.5 kg)   SpO2 98%   BMI 24.13 kg/m²   Body mass index is 24.13 kg/m². Physical Exam  Vitals reviewed. Constitutional:       General: He is not in acute distress. Appearance: Normal appearance. He is not ill-appearing. HENT:      Head: Normocephalic and atraumatic. Eyes:      General: No scleral icterus. Extraocular Movements: Extraocular movements intact. Conjunctiva/sclera: Conjunctivae normal.   Neck:      Vascular: No carotid bruit. Cardiovascular:      Rate and Rhythm: Normal rate. Rhythm irregular. Heart sounds: Murmur heard. Pulmonary:      Effort: Pulmonary effort is normal.      Breath sounds: Normal breath sounds. Musculoskeletal:         General: No swelling. Skin:     Coloration: Skin is not jaundiced. Findings: No rash. Neurological:      General: No focal deficit present. Mental Status: He is alert. Mental status is at baseline. Cranial Nerves: No cranial nerve deficit. Motor: No weakness. Gait: Gait normal.   Psychiatric:         Mood and Affect: Mood normal.         Behavior: Behavior normal.         Thought Content: Thought content normal.         Judgment: Judgment normal.         Assessment/Plan:  Guillermina Hoover was seen today for hypertension.     Diagnoses and all

## 2022-09-21 ENCOUNTER — OFFICE VISIT (OUTPATIENT)
Dept: VASCULAR SURGERY | Age: 77
End: 2022-09-21
Payer: MEDICARE

## 2022-09-21 VITALS
HEART RATE: 72 BPM | WEIGHT: 177 LBS | TEMPERATURE: 97.3 F | DIASTOLIC BLOOD PRESSURE: 86 MMHG | HEIGHT: 71 IN | OXYGEN SATURATION: 95 % | SYSTOLIC BLOOD PRESSURE: 150 MMHG | BODY MASS INDEX: 24.78 KG/M2

## 2022-09-21 DIAGNOSIS — Z98.890 H/O CAROTID ENDARTERECTOMY: Primary | ICD-10-CM

## 2022-09-21 DIAGNOSIS — I65.22 STENOSIS OF LEFT CAROTID ARTERY: ICD-10-CM

## 2022-09-21 PROCEDURE — 99213 OFFICE O/P EST LOW 20 MIN: CPT | Performed by: NURSE PRACTITIONER

## 2022-09-21 PROCEDURE — 1123F ACP DISCUSS/DSCN MKR DOCD: CPT | Performed by: NURSE PRACTITIONER

## 2022-09-21 PROCEDURE — G8420 CALC BMI NORM PARAMETERS: HCPCS | Performed by: NURSE PRACTITIONER

## 2022-09-21 PROCEDURE — 1036F TOBACCO NON-USER: CPT | Performed by: NURSE PRACTITIONER

## 2022-09-21 PROCEDURE — G8428 CUR MEDS NOT DOCUMENT: HCPCS | Performed by: NURSE PRACTITIONER

## 2022-09-21 PROCEDURE — 3017F COLORECTAL CA SCREEN DOC REV: CPT | Performed by: NURSE PRACTITIONER

## 2022-09-21 ASSESSMENT — PATIENT HEALTH QUESTIONNAIRE - PHQ9
SUM OF ALL RESPONSES TO PHQ QUESTIONS 1-9: 0
SUM OF ALL RESPONSES TO PHQ9 QUESTIONS 1 & 2: 0
SUM OF ALL RESPONSES TO PHQ QUESTIONS 1-9: 0
1. LITTLE INTEREST OR PLEASURE IN DOING THINGS: 0
SUM OF ALL RESPONSES TO PHQ QUESTIONS 1-9: 0
SUM OF ALL RESPONSES TO PHQ QUESTIONS 1-9: 0
2. FEELING DOWN, DEPRESSED OR HOPELESS: 0

## 2022-09-21 NOTE — PROGRESS NOTES
DATE OF VISIT: 9/21/2022      [facility]  Gio Burr is a 77 y.o. male who follows up for his 6-month carotid duplex study. He denies any new lateralizing neurological symptoms. He remains on Xarelto and atorvastatin. He has undergone a right carotid endarterectomy. MEDICAL HISTORY:   Past Medical History:   Diagnosis Date    Aortic valve sclerosis     Bradycardia     Bundle branch block          Cancer (HCC)     prostate and lymph nodes    Diverticulitis     High cholesterol     History of basal cell cancer     forehead    History of colonic polyps     History of prostate cancer 2013    External Beam and brachytherapy    Hypertension     Mild aortic stenosis     PVC (premature ventricular contraction)     Varicella          SURGICAL HISTORY:   Past Surgical History:   Procedure Laterality Date    CAROTID ENDARTERECTOMY Right 12/15/14    COLONOSCOPY  11/2012    LYMPH NODE DISSECTION      PACEMAKER  04/28/2019       Review of Systems:  Constitutional:   Negative for fevers and unexplained weight loss. Respiratory:   Negative for hemoptysis. Cardiovascular:   Negative except as noted in HPI. Musculoskeletal:  Negative for active, unexplained/severe joint pain. ALLERGIES: No Known Allergies    CURRENT MEDICATIONS:  Current Outpatient Medications   Medication Sig Dispense Refill    amLODIPine (NORVASC) 5 MG tablet Take 1 tablet by mouth daily TAKE 1 TABLET BY MOUTH IN THE MORNING FOR HIGH BLOOD PRESSURE 90 tablet 1    atorvastatin (LIPITOR) 20 MG tablet Take 1 tablet by mouth daily Take 1 tablet by mouth nightly 90 tablet 1    losartan (COZAAR) 100 MG tablet Take 1 tablet by mouth daily TAKE 1 TABLET BY MOUTH ONCE DAILY 90 tablet 1    abiraterone acetate (ZYTIGA) 250 MG tablet Take 4 tablets (1,000 mg total) by mouth once daily Take on an empty stomach.       predniSONE (DELTASONE) 5 MG tablet TAKE 1 TABLET BY MOUTH IN THE MORNING FOR 10 DAYS      rivaroxaban (XARELTO) 20 MG TABS tablet Take 1 tablet by mouth Daily with supper 30 tablet 5     No current facility-administered medications for this visit. IMAGING: Interpretation Summary         Right Carotid: Patch observed in the right CCA/ICA system. Patient is post CEA. B-mode, color and pulsed Doppler imaging indicate a normal extracranial cerebrovascular study. Left Carotid: Moderate (50-69%) stenosis in the left internal carotid artery. Calcific plaque in the left internal carotid artery. The vertebral arteries are patent with antegrade flow bilaterally. The abdominal aorta was evaluated and measured 1.6cm x 1.8cm at its maximum diameter, no aneurysm visualized on limited evaluation of abdominal aorta. Procedure Details    A gray scale, color Doppler imaging and spectral Doppler analysis ultrasound was performed. During the study longitudinal and transverse views were obtained. Pulsed wave doppler was performed. Overall the study quality was good. Cerebrovascular Findings    Right Carotid    Patient is s/p carotid endarterectomy. Internal Carotid Artery: No stenosis. Vertebral Artery: Flow is antegrade. Left Carotid    Internal Carotid Artery: Moderate (50-69%) stenosis in the proximal ICA. Calcific plaque. Vertebral Artery: Flow is antegrade.      Carotid Measurements     Right PSV Right EDV Left PSV Left EDV   CCA Prox 84 cm/s        18.2 cm/s        115 cm/s        22 cm/s          CCA Dist 77.7 cm/s        16.1 cm/s        93.5 cm/s        22.3 cm/s          ICA Prox 39.4 cm/s        7.7 cm/s        186 cm/s        20.1 cm/s          ICA Mid 78.7 cm/s        22.1 cm/s        92.2 cm/s        25.4 cm/s          ICA Dist 77.3 cm/s        20.2 cm/s        70 cm/s        21.7 cm/s          ICA/CCA Ratio 1.01         1.99           ECA 63.6 cm/s        10.8 cm/s        152 cm/s        15.4 cm/s          Vertebral 76.8 cm/s        14.9 cm/s        37.4 cm/s        0 cm/s            Abdominal Aorta Measurements     PSV AP TR   Dist Ao 123 cm/s        1.62 cm        1.81 cm                                    Procedure Staff    Technologist/Clinician: Kerrie Lackey  Supporting Staff: None  Performing Physician/Midlevel: None     Exam Completion Date/Time: 9/21/22  9:48 AM      PHYSICAL EXAM  VITALS: BP (!) 150/86 (Site: Left Upper Arm, Position: Supine, Cuff Size: Medium Adult)   Pulse 72   Temp 97.3 °F (36.3 °C) (Temporal)   Ht 5' 11\" (1.803 m)   Wt 177 lb (80.3 kg)   SpO2 95%   BMI 24.69 kg/m²       GENERAL: Well developed, well nourished, in NAD  HEAD/NECK: normocephalic, atraumatic, neck supple  MUSCULOSKELETAL: normal gait  NEURO: sensation and strength grossly intact and symmetrical  PSYCH: alert and oriented to person, place and time      Assessment/Plan: Patient is a 71-year-old male who follows up for 6-month carotid duplex study. No new lateralizing neurological symptoms. He remains on Xarelto and atorvastatin. Duplex study is stable with asymptomatic left carotid stenosis less than 70%. He is post right carotid endarterectomy. We will continue to follow him with 6-month duplex studies. He is to present to the emergency department with any S/S of a stroke i.e. slurred speech, facial drooping, amaurosis fugax or unilateral weakness.         ARON Fletcher - CNP    20 minutes of time was spent on this encounter including chart review, assessment and evaluation

## 2022-10-06 DIAGNOSIS — C61 MALIGNANT NEOPLASM OF PROSTATE (HCC): Primary | ICD-10-CM

## 2022-10-07 DIAGNOSIS — C61 MALIGNANT NEOPLASM OF PROSTATE (HCC): ICD-10-CM

## 2022-10-07 LAB — PSA SERPL-MCNC: <0.1 NG/ML

## 2022-10-14 ENCOUNTER — OFFICE VISIT (OUTPATIENT)
Dept: UROLOGY | Age: 77
End: 2022-10-14
Payer: MEDICARE

## 2022-10-14 DIAGNOSIS — N40.1 BENIGN PROSTATIC HYPERPLASIA WITH LOWER URINARY TRACT SYMPTOMS, SYMPTOM DETAILS UNSPECIFIED: ICD-10-CM

## 2022-10-14 DIAGNOSIS — C61 MALIGNANT NEOPLASM OF PROSTATE (HCC): Primary | ICD-10-CM

## 2022-10-14 LAB
BILIRUBIN, URINE, POC: NEGATIVE
BLOOD URINE, POC: NEGATIVE
GLUCOSE URINE, POC: NEGATIVE
KETONES, URINE, POC: NEGATIVE
LEUKOCYTE ESTERASE, URINE, POC: NEGATIVE
NITRITE, URINE, POC: NEGATIVE
PH, URINE, POC: 7 (ref 4.6–8)
PROTEIN,URINE, POC: NEGATIVE
SPECIFIC GRAVITY, URINE, POC: 1.01 (ref 1–1.03)
URINALYSIS CLARITY, POC: NORMAL
URINALYSIS COLOR, POC: NORMAL
UROBILINOGEN, POC: NORMAL

## 2022-10-14 PROCEDURE — 1036F TOBACCO NON-USER: CPT | Performed by: UROLOGY

## 2022-10-14 PROCEDURE — 99214 OFFICE O/P EST MOD 30 MIN: CPT | Performed by: UROLOGY

## 2022-10-14 PROCEDURE — 3017F COLORECTAL CA SCREEN DOC REV: CPT | Performed by: UROLOGY

## 2022-10-14 PROCEDURE — G8427 DOCREV CUR MEDS BY ELIG CLIN: HCPCS | Performed by: UROLOGY

## 2022-10-14 PROCEDURE — 81003 URINALYSIS AUTO W/O SCOPE: CPT | Performed by: UROLOGY

## 2022-10-14 PROCEDURE — 1123F ACP DISCUSS/DSCN MKR DOCD: CPT | Performed by: UROLOGY

## 2022-10-14 PROCEDURE — G8484 FLU IMMUNIZE NO ADMIN: HCPCS | Performed by: UROLOGY

## 2022-10-14 PROCEDURE — G8420 CALC BMI NORM PARAMETERS: HCPCS | Performed by: UROLOGY

## 2022-10-26 PROCEDURE — 93296 REM INTERROG EVL PM/IDS: CPT | Performed by: INTERNAL MEDICINE

## 2022-10-26 PROCEDURE — 93294 REM INTERROG EVL PM/LDLS PM: CPT | Performed by: INTERNAL MEDICINE

## 2022-11-14 DIAGNOSIS — Z95.0 PACEMAKER: Primary | ICD-10-CM

## 2022-11-14 DIAGNOSIS — R00.1 BRADYCARDIA: ICD-10-CM

## 2022-11-21 DIAGNOSIS — R00.1 BRADYCARDIA: ICD-10-CM

## 2022-11-21 DIAGNOSIS — Z95.0 PACEMAKER: ICD-10-CM

## 2022-11-21 DIAGNOSIS — Z95.0 PACEMAKER: Primary | ICD-10-CM

## 2022-12-21 RX ORDER — RIVAROXABAN 20 MG/1
TABLET, FILM COATED ORAL
Qty: 90 TABLET | Refills: 3 | Status: SHIPPED | OUTPATIENT
Start: 2022-12-21

## 2023-01-18 ENCOUNTER — OFFICE VISIT (OUTPATIENT)
Dept: INTERNAL MEDICINE CLINIC | Facility: CLINIC | Age: 78
End: 2023-01-18
Payer: MEDICARE

## 2023-01-18 VITALS
WEIGHT: 175 LBS | TEMPERATURE: 97.9 F | BODY MASS INDEX: 24.5 KG/M2 | DIASTOLIC BLOOD PRESSURE: 75 MMHG | HEIGHT: 71 IN | HEART RATE: 52 BPM | SYSTOLIC BLOOD PRESSURE: 145 MMHG | OXYGEN SATURATION: 99 %

## 2023-01-18 DIAGNOSIS — I10 ESSENTIAL HYPERTENSION: Primary | ICD-10-CM

## 2023-01-18 DIAGNOSIS — I65.29 STENOSIS OF CAROTID ARTERY, UNSPECIFIED LATERALITY: ICD-10-CM

## 2023-01-18 DIAGNOSIS — I35.0 MILD AORTIC STENOSIS: ICD-10-CM

## 2023-01-18 DIAGNOSIS — E78.49 OTHER HYPERLIPIDEMIA: ICD-10-CM

## 2023-01-18 DIAGNOSIS — I48.92 ATRIAL FLUTTER, UNSPECIFIED TYPE (HCC): ICD-10-CM

## 2023-01-18 DIAGNOSIS — C61 MALIGNANT NEOPLASM OF PROSTATE (HCC): ICD-10-CM

## 2023-01-18 PROCEDURE — 3077F SYST BP >= 140 MM HG: CPT | Performed by: INTERNAL MEDICINE

## 2023-01-18 PROCEDURE — 3078F DIAST BP <80 MM HG: CPT | Performed by: INTERNAL MEDICINE

## 2023-01-18 PROCEDURE — G8427 DOCREV CUR MEDS BY ELIG CLIN: HCPCS | Performed by: INTERNAL MEDICINE

## 2023-01-18 PROCEDURE — 99214 OFFICE O/P EST MOD 30 MIN: CPT | Performed by: INTERNAL MEDICINE

## 2023-01-18 PROCEDURE — G8484 FLU IMMUNIZE NO ADMIN: HCPCS | Performed by: INTERNAL MEDICINE

## 2023-01-18 PROCEDURE — 1036F TOBACCO NON-USER: CPT | Performed by: INTERNAL MEDICINE

## 2023-01-18 PROCEDURE — G8420 CALC BMI NORM PARAMETERS: HCPCS | Performed by: INTERNAL MEDICINE

## 2023-01-18 PROCEDURE — 1123F ACP DISCUSS/DSCN MKR DOCD: CPT | Performed by: INTERNAL MEDICINE

## 2023-01-18 RX ORDER — LOSARTAN POTASSIUM 100 MG/1
100 TABLET ORAL DAILY
Qty: 90 TABLET | Refills: 1 | Status: CANCELLED | OUTPATIENT
Start: 2023-01-18

## 2023-01-18 RX ORDER — ATORVASTATIN CALCIUM 20 MG/1
20 TABLET, FILM COATED ORAL DAILY
Qty: 90 TABLET | Refills: 1 | Status: SHIPPED | OUTPATIENT
Start: 2023-01-18

## 2023-01-18 RX ORDER — LOSARTAN POTASSIUM AND HYDROCHLOROTHIAZIDE 12.5; 1 MG/1; MG/1
1 TABLET ORAL
Qty: 90 TABLET | Refills: 1 | Status: SHIPPED | OUTPATIENT
Start: 2023-01-18

## 2023-01-18 RX ORDER — AMLODIPINE BESYLATE 5 MG/1
TABLET ORAL
Qty: 90 TABLET | Refills: 1 | Status: SHIPPED | OUTPATIENT
Start: 2023-01-18

## 2023-01-18 ASSESSMENT — ANXIETY QUESTIONNAIRES
6. BECOMING EASILY ANNOYED OR IRRITABLE: 0
GAD7 TOTAL SCORE: 0
4. TROUBLE RELAXING: 0
3. WORRYING TOO MUCH ABOUT DIFFERENT THINGS: 0
7. FEELING AFRAID AS IF SOMETHING AWFUL MIGHT HAPPEN: 0
1. FEELING NERVOUS, ANXIOUS, OR ON EDGE: 0
IF YOU CHECKED OFF ANY PROBLEMS ON THIS QUESTIONNAIRE, HOW DIFFICULT HAVE THESE PROBLEMS MADE IT FOR YOU TO DO YOUR WORK, TAKE CARE OF THINGS AT HOME, OR GET ALONG WITH OTHER PEOPLE: NOT DIFFICULT AT ALL
2. NOT BEING ABLE TO STOP OR CONTROL WORRYING: 0
5. BEING SO RESTLESS THAT IT IS HARD TO SIT STILL: 0

## 2023-01-18 ASSESSMENT — PATIENT HEALTH QUESTIONNAIRE - PHQ9
1. LITTLE INTEREST OR PLEASURE IN DOING THINGS: 0
SUM OF ALL RESPONSES TO PHQ QUESTIONS 1-9: 0
SUM OF ALL RESPONSES TO PHQ QUESTIONS 1-9: 0
SUM OF ALL RESPONSES TO PHQ9 QUESTIONS 1 & 2: 0
SUM OF ALL RESPONSES TO PHQ QUESTIONS 1-9: 0
SUM OF ALL RESPONSES TO PHQ QUESTIONS 1-9: 0
2. FEELING DOWN, DEPRESSED OR HOPELESS: 0

## 2023-01-18 ASSESSMENT — ENCOUNTER SYMPTOMS: SHORTNESS OF BREATH: 0

## 2023-01-18 NOTE — PROGRESS NOTES
Blake Peterson M.D. Internal Medicine  [address], [address]   Office : [phone]  Fax : [phone]    Chief Complaint   Patient presents with    Hypertension     4 mo follow up       History of Present Illness:  Riaz Murphy is a 69 y.o. male. HPI    Hypertension  Patient is in for Hypertension which is stable. Diet and Lifestyle: generally follows a low sodium diet  exercises sporadically  nonsmoker  Home BP Monitoring: is borderline controlled at home, ranging 140's/70's . Patient's recent blood pressures were   BP Readings from Last 3 Encounters:   01/18/23 (!) 145/75   09/21/22 (!) 150/86   09/14/22 (!) 145/80   . taking medications as instructed, no medication side effects noted, no TIA's, no chest pain on exertion, no dyspnea on exertion, no swelling of ankles. Cardiac risk factors consist of advanced age (older than 54 for men, 72 for women), dyslipidemia, hypertension, and male gender. Hyperlipidemia  Patient is in for follow-up for hyperlipidemia. Diet and Lifestyle: generally follows a low fat low cholesterol diet. Risk factors for vascular disease consist of hyperlipidemia and hypertension. Last LDL was   Lab Results   Component Value Date    LDLCALC 71 09/14/2022   . Last ALT was   Lab Results   Component Value Date/Time    ALT 30 09/08/2021 11:17 AM   .  No muscle aches. Prostate cancer  Has recurred after 5 years from original diagnosis in lymph nodes. Current treatment is Eligard, abiraterone and prednisone. Finished RT  Lab Results   Component Value Date    PSA <0.1 10/07/2022    PSA 0.5 02/12/2021       Carotid Stenosis  Followed by vascular surgery. Had carotid doppler in  March 2022    Afib S/p Atrial Flutter  Had successful RF ablation for aflutter and stopped DOAC. Cardiology is monitoring pacemaker and mild aortic stenosis. Last echo 1/2021.   Back on DOAC likely long term    Past Medical History:  Past Medical History:   Diagnosis Date    Aortic valve sclerosis     Bradycardia     Bundle branch block          Cancer (HCC)     prostate and lymph nodes    Diverticulitis     High cholesterol     History of basal cell cancer     forehead    History of colonic polyps     History of prostate cancer 2013    External Beam and brachytherapy    Hypertension     Mild aortic stenosis     PVC (premature ventricular contraction)     Varicella      Past Surgical History:  Past Surgical History:   Procedure Laterality Date    CAROTID ENDARTERECTOMY Right 12/15/14    COLONOSCOPY  2012    LYMPH NODE DISSECTION      PACEMAKER  2019     Allergies:   No Known Allergies  Medications:   Current Outpatient Medications   Medication Sig Dispense Refill    amLODIPine (NORVASC) 5 MG tablet TAKE 1 TABLET BY MOUTH IN THE MORNING FOR HIGH BLOOD PRESSURE 90 tablet 1    atorvastatin (LIPITOR) 20 MG tablet Take 1 tablet by mouth daily Take 1 tablet by mouth nightly 90 tablet 1    losartan-hydroCHLOROthiazide (HYZAAR) 100-12.5 MG per tablet Take 1 tablet by mouth Daily with supper 90 tablet 1    XARELTO 20 MG TABS tablet TAKE 1 TABLET BY MOUTH ONCE DAILY WITH SUPPER 90 tablet 3    abiraterone acetate (ZYTIGA) 250 MG tablet Take 4 tablets (1,000 mg total) by mouth once daily Take on an empty stomach. predniSONE (DELTASONE) 5 MG tablet TAKE 1 TABLET BY MOUTH IN THE MORNING FOR 10 DAYS       No current facility-administered medications for this visit. Social History:  Social History     Tobacco Use    Smoking status: Former     Packs/day: 0.25     Years: 15.00     Pack years: 3.75     Types: Cigarettes     Quit date: 1976     Years since quittin.0    Smokeless tobacco: Never   Substance Use Topics    Alcohol use:  Yes     Alcohol/week: 4.0 - 5.0 standard drinks     Family History  Family History   Problem Relation Age of Onset    Cancer Mother         colon    Heart Disease Mother     Cancer Father         lung Heart Disease Sister        Review of Systems   Constitutional:  Negative for chills, fatigue and fever. Respiratory:  Negative for shortness of breath. Cardiovascular:  Negative for chest pain. Musculoskeletal:  Negative for gait problem. Skin:  Negative for rash. Neurological:  Negative for speech difficulty. Psychiatric/Behavioral:  Negative for dysphoric mood. Vital Signs  BP (!) 145/75 (Site: Left Upper Arm, Position: Sitting)   Pulse 52   Temp 97.9 °F (36.6 °C) (Temporal)   Ht 5' 11\" (1.803 m)   Wt 175 lb (79.4 kg)   SpO2 99%   BMI 24.41 kg/m²   Body mass index is 24.41 kg/m². Physical Exam  Vitals reviewed. Constitutional:       General: He is not in acute distress. Appearance: Normal appearance. He is not ill-appearing. HENT:      Head: Normocephalic and atraumatic. Eyes:      General: No scleral icterus. Conjunctiva/sclera: Conjunctivae normal.   Neck:      Vascular: No carotid bruit. Cardiovascular:      Rate and Rhythm: Normal rate and regular rhythm. Heart sounds: Normal heart sounds. No murmur heard. Pulmonary:      Effort: Pulmonary effort is normal.      Breath sounds: Normal breath sounds. Musculoskeletal:         General: No swelling. Skin:     Coloration: Skin is not jaundiced. Findings: No rash. Neurological:      General: No focal deficit present. Mental Status: He is alert. Mental status is at baseline. Cranial Nerves: No cranial nerve deficit. Motor: No weakness. Gait: Gait normal.   Psychiatric:         Mood and Affect: Mood normal.         Behavior: Behavior normal.         Thought Content: Thought content normal.         Judgment: Judgment normal.         Assessment/Plan:  Virgie Byrd was seen today for hypertension.     Diagnoses and all orders for this visit:    Essential hypertension  -     amLODIPine (NORVASC) 5 MG tablet; TAKE 1 TABLET BY MOUTH IN THE MORNING FOR HIGH BLOOD PRESSURE  - losartan-hydroCHLOROthiazide (HYZAAR) 100-12.5 MG per tablet; Take 1 tablet by mouth Daily with supper    Other hyperlipidemia  -     atorvastatin (LIPITOR) 20 MG tablet; Take 1 tablet by mouth daily Take 1 tablet by mouth nightly    Atrial flutter, unspecified type ([facility] [address]    Malignant neoplasm of prostate ([facility]    Stenosis of carotid artery, unspecified laterality    Mild aortic stenosis      Return in about 4 months (around 5/18/2023), or if symptoms worsen or fail to improve.   __  Garrick Lr M.D.

## 2023-01-20 PROCEDURE — 93296 REM INTERROG EVL PM/IDS: CPT | Performed by: INTERNAL MEDICINE

## 2023-01-20 PROCEDURE — 93294 REM INTERROG EVL PM/LDLS PM: CPT | Performed by: INTERNAL MEDICINE

## 2023-01-22 ENCOUNTER — HOSPITAL ENCOUNTER (EMERGENCY)
Age: 78
Discharge: HOME | End: 2023-01-22
Attending: EMERGENCY MEDICINE
Payer: MEDICARE

## 2023-01-22 ENCOUNTER — APPOINTMENT (OUTPATIENT)
Dept: CT IMAGING | Age: 78
End: 2023-01-22
Payer: MEDICARE

## 2023-01-22 VITALS
HEART RATE: 59 BPM | SYSTOLIC BLOOD PRESSURE: 164 MMHG | BODY MASS INDEX: 24.91 KG/M2 | WEIGHT: 174 LBS | RESPIRATION RATE: 16 BRPM | HEIGHT: 70 IN | OXYGEN SATURATION: 97 % | DIASTOLIC BLOOD PRESSURE: 95 MMHG | TEMPERATURE: 97.6 F

## 2023-01-22 DIAGNOSIS — N13.9 ACUTE UNILATERAL OBSTRUCTIVE UROPATHY: Primary | ICD-10-CM

## 2023-01-22 DIAGNOSIS — R31.0 GROSS HEMATURIA: ICD-10-CM

## 2023-01-22 DIAGNOSIS — N20.0 KIDNEY STONE: ICD-10-CM

## 2023-01-22 LAB
ALBUMIN SERPL-MCNC: 4.6 G/DL (ref 3.2–4.6)
ALBUMIN/GLOB SERPL: 1.8 (ref 0.4–1.6)
ALP SERPL-CCNC: 80 U/L (ref 45–117)
ALT SERPL-CCNC: 25 U/L (ref 13–61)
ANION GAP SERPL CALC-SCNC: 9 MMOL/L (ref 2–11)
APPEARANCE UR: ABNORMAL
AST SERPL-CCNC: 26 U/L (ref 15–37)
BACTERIA URNS QL MICRO: 0 /HPF
BASOPHILS # BLD: 0 K/UL (ref 0–0.2)
BASOPHILS NFR BLD: 0 % (ref 0–2)
BILIRUB SERPL-MCNC: 0.6 MG/DL (ref 0.2–1.1)
BILIRUB UR QL: NEGATIVE
BUN SERPL-MCNC: 18 MG/DL (ref 7–18)
CALCIUM SERPL-MCNC: 10 MG/DL (ref 8.3–10.4)
CASTS URNS QL MICRO: 0 /LPF
CHLORIDE SERPL-SCNC: 101 MMOL/L (ref 98–107)
CO2 SERPL-SCNC: 29 MMOL/L (ref 21–32)
COLOR UR: ABNORMAL
CREAT SERPL-MCNC: 0.83 MG/DL (ref 0.8–1.5)
CRYSTALS URNS QL MICRO: 0 /LPF
DIFFERENTIAL METHOD BLD: ABNORMAL
EOSINOPHIL # BLD: 0 K/UL (ref 0–0.8)
EOSINOPHIL NFR BLD: 0 % (ref 0.5–7.8)
EPI CELLS #/AREA URNS HPF: 0 /HPF
ERYTHROCYTE [DISTWIDTH] IN BLOOD BY AUTOMATED COUNT: 13.1 % (ref 11.9–14.6)
GLOBULIN SER CALC-MCNC: 2.5 G/DL (ref 2.8–4.5)
GLUCOSE SERPL-MCNC: 136 MG/DL (ref 65–100)
GLUCOSE UR STRIP.AUTO-MCNC: NEGATIVE MG/DL
HCT VFR BLD AUTO: 41.6 % (ref 41.1–50.3)
HGB BLD-MCNC: 14.4 G/DL (ref 13.6–17.2)
HGB UR QL STRIP: ABNORMAL
IMM GRANULOCYTES # BLD AUTO: 0 K/UL (ref 0–0.5)
IMM GRANULOCYTES NFR BLD AUTO: 1 % (ref 0–5)
KETONES UR QL STRIP.AUTO: ABNORMAL MG/DL
LACTATE SERPL-SCNC: 1.5 MMOL/L (ref 0.4–2)
LEUKOCYTE ESTERASE UR QL STRIP.AUTO: NEGATIVE
LIPASE SERPL-CCNC: 24 U/L (ref 13–60)
LYMPHOCYTES # BLD: 0.6 K/UL (ref 0.5–4.6)
LYMPHOCYTES NFR BLD: 7 % (ref 13–44)
MCH RBC QN AUTO: 31.6 PG (ref 26.1–32.9)
MCHC RBC AUTO-ENTMCNC: 34.6 G/DL (ref 31.4–35)
MCV RBC AUTO: 91.2 FL (ref 82–102)
MONOCYTES # BLD: 0.6 K/UL (ref 0.1–1.3)
MONOCYTES NFR BLD: 6 % (ref 4–12)
MUCOUS THREADS URNS QL MICRO: NORMAL /LPF
NEUTS SEG # BLD: 7.5 K/UL (ref 1.7–8.2)
NEUTS SEG NFR BLD: 86 % (ref 43–78)
NITRITE UR QL STRIP.AUTO: NEGATIVE
NRBC # BLD: 0 K/UL (ref 0–0.2)
OTHER OBSERVATIONS: NORMAL
PH UR STRIP: 6 (ref 5–9)
PLATELET # BLD AUTO: 161 K/UL (ref 150–450)
PMV BLD AUTO: 9.3 FL (ref 9.4–12.3)
POTASSIUM SERPL-SCNC: 4.7 MMOL/L (ref 3.5–5.1)
PROT SERPL-MCNC: 7.1 G/DL (ref 6.4–8.2)
PROT UR STRIP-MCNC: 100 MG/DL
RBC # BLD AUTO: 4.56 M/UL (ref 4.23–5.6)
RBC #/AREA URNS HPF: >100 /HPF
SODIUM SERPL-SCNC: 139 MMOL/L (ref 133–143)
SP GR UR REFRACTOMETRY: >=1.03 (ref 1–1.02)
UROBILINOGEN UR QL STRIP.AUTO: 1 EU/DL (ref 0.2–1)
WBC # BLD AUTO: 8.7 K/UL (ref 4.3–11.1)
WBC URNS QL MICRO: NORMAL /HPF

## 2023-01-22 PROCEDURE — 6370000000 HC RX 637 (ALT 250 FOR IP): Performed by: EMERGENCY MEDICINE

## 2023-01-22 PROCEDURE — 85025 COMPLETE CBC W/AUTO DIFF WBC: CPT

## 2023-01-22 PROCEDURE — 99284 EMERGENCY DEPT VISIT MOD MDM: CPT

## 2023-01-22 PROCEDURE — 6360000002 HC RX W HCPCS: Performed by: EMERGENCY MEDICINE

## 2023-01-22 PROCEDURE — 81001 URINALYSIS AUTO W/SCOPE: CPT

## 2023-01-22 PROCEDURE — 83690 ASSAY OF LIPASE: CPT

## 2023-01-22 PROCEDURE — 74176 CT ABD & PELVIS W/O CONTRAST: CPT

## 2023-01-22 PROCEDURE — 83605 ASSAY OF LACTIC ACID: CPT

## 2023-01-22 PROCEDURE — 80053 COMPREHEN METABOLIC PANEL: CPT

## 2023-01-22 PROCEDURE — 96374 THER/PROPH/DIAG INJ IV PUSH: CPT

## 2023-01-22 RX ORDER — TAMSULOSIN HYDROCHLORIDE 0.4 MG/1
0.4 CAPSULE ORAL ONCE
Status: COMPLETED | OUTPATIENT
Start: 2023-01-22 | End: 2023-01-22

## 2023-01-22 RX ORDER — TAMSULOSIN HYDROCHLORIDE 0.4 MG/1
0.4 CAPSULE ORAL DAILY
Qty: 30 CAPSULE | Refills: 0 | Status: SHIPPED | OUTPATIENT
Start: 2023-01-22

## 2023-01-22 RX ORDER — ONDANSETRON 8 MG/1
8 TABLET, ORALLY DISINTEGRATING ORAL EVERY 8 HOURS PRN
Qty: 20 TABLET | Refills: 0 | Status: SHIPPED | OUTPATIENT
Start: 2023-01-22

## 2023-01-22 RX ORDER — KETOROLAC TROMETHAMINE 30 MG/ML
15 INJECTION, SOLUTION INTRAMUSCULAR; INTRAVENOUS ONCE
Status: COMPLETED | OUTPATIENT
Start: 2023-01-22 | End: 2023-01-22

## 2023-01-22 RX ORDER — HYDROCODONE BITARTRATE AND ACETAMINOPHEN 5; 325 MG/1; MG/1
1 TABLET ORAL EVERY 6 HOURS PRN
Qty: 10 TABLET | Refills: 0 | Status: SHIPPED | OUTPATIENT
Start: 2023-01-22 | End: 2023-01-25

## 2023-01-22 RX ADMIN — TAMSULOSIN HYDROCHLORIDE 0.4 MG: 0.4 CAPSULE ORAL at 18:57

## 2023-01-22 RX ADMIN — KETOROLAC TROMETHAMINE 15 MG: 30 INJECTION, SOLUTION INTRAMUSCULAR at 18:57

## 2023-01-22 ASSESSMENT — ENCOUNTER SYMPTOMS
ABDOMINAL PAIN: 1
RESPIRATORY NEGATIVE: 1

## 2023-01-22 ASSESSMENT — PAIN SCALES - GENERAL
PAINLEVEL_OUTOF10: 5
PAINLEVEL_OUTOF10: 5

## 2023-01-22 ASSESSMENT — PAIN - FUNCTIONAL ASSESSMENT
PAIN_FUNCTIONAL_ASSESSMENT: NONE - DENIES PAIN
PAIN_FUNCTIONAL_ASSESSMENT: 0-10

## 2023-01-22 ASSESSMENT — PAIN DESCRIPTION - LOCATION: LOCATION: ABDOMEN

## 2023-01-22 NOTE — ED PROVIDER NOTES
Emergency Department Provider Note                   PCP:                Beto Fowler MD               Age: 69 y.o. Sex: male       ICD-10-CM    1. Acute unilateral obstructive uropathy  N13.9       2. Kidney stone  N20.0 HYDROcodone-acetaminophen (NORCO) 5-325 MG per tablet      3. Gross hematuria  R31.0           DISPOSITION Discharge - Pending Orders Complete 01/22/2023 07:06:53 PM        Medical Decision Making  71-year-old  male presented to the emergency department with complaints of left lower quadrant pain and hematuria that began earlier today. Patient's labs and urine showed no signs of infection. Patient CT scan was reviewed independently patient has 4 mm UVJ stone on the left with moderate hydronephrosis. Patient's pain is well controlled. Discussed with urology and patient will follow-up in the office this week. He will hold his Xarelto for the next 2 days. He will return to the emergency department for uncontrolled pain, uncontrolled vomiting, fever or any other concerns. Amount and/or Complexity of Data Reviewed  Labs: ordered. Decision-making details documented in ED Course. Radiology: ordered and independent interpretation performed. Decision-making details documented in ED Course. Discussion of management or test interpretation with external provider(s): Discussed with urology. Patient will hold Xarelto for the next 2 days. Patient will follow up with urology this week. He will return the emergency department for uncontrolled pain, uncontrolled vomiting, fever or any other concerns. Risk  Prescription drug management. ED Course as of 01/22/23 1926   Demario Sotelo Jan 22, 2023   1900 Patient CT independently reviewed. Patient with 5 mm UVJ stone with moderate hydronephrosis. [JL]   1907 Discussed with Dr. Dallas Cruz with urology. Patient can follow-up in the office this week. Recommended holding Xarelto if no stents with then past 6 months. Patient has not. Patient will call office in a.m. [JL]      ED Course User Index  [JL] Shaheed Garcia MD        Orders Placed This Encounter   Procedures    CT ABDOMEN PELVIS RENAL STONE    Urinalysis w rflx microscopic    Urinalysis, Micro    CBC with Auto Differential    Comprehensive Metabolic Panel    Lipase    Lactic Acid        Medications   ketorolac (TORADOL) injection 15 mg (15 mg IntraVENous Given 1/22/23 1857)   tamsulosin (FLOMAX) capsule 0.4 mg (0.4 mg Oral Given 1/22/23 1857)       New Prescriptions    HYDROCODONE-ACETAMINOPHEN (NORCO) 5-325 MG PER TABLET    Take 1 tablet by mouth every 6 hours as needed for Pain for up to 3 days. Intended supply: 3 days. Take lowest dose possible to manage pain Max Daily Amount: 4 tablets    ONDANSETRON (ZOFRAN-ODT) 8 MG TBDP DISINTEGRATING TABLET    Place 1 tablet under the tongue every 8 hours as needed for Nausea or Vomiting    TAMSULOSIN (FLOMAX) 0.4 MG CAPSULE    Take 1 capsule by mouth daily        Andriy Mancera is a 69 y.o. male who presents to the Emergency Department with chief complaint of    Chief Complaint   Patient presents with    Hematuria      60-year-old  male with history of a flutter, carotid stenosis, pacemaker, kidney stones presented to the emergency department with complaints of hematuria that began this morning with associated left lower quadrant abdominal pain. Patient does have a history of kidney stones as mentioned above. Patient is on Xarelto for his atrial flutter. Patient denies any fever, chills, nausea, vomiting, diarrhea, changes in bowel or bladder habits or other concerns. Patient states that with his previous kidney stone he followed up with urology and patient was able to pass stone on his own. Patient denies any back pain or flank pain. Patient denies history of diverticulitis. He has no other complaints at this time. The history is provided by the patient and medical records.        Review of Systems Constitutional: Negative. HENT: Negative. Respiratory: Negative. Cardiovascular: Negative. Gastrointestinal:  Positive for abdominal pain. Genitourinary:  Positive for hematuria. Musculoskeletal: Negative. Neurological: Negative. All other systems reviewed and are negative. Past Medical History:   Diagnosis Date    Aortic valve sclerosis     Bradycardia     Bundle branch block          Cancer (HCC)     prostate and lymph nodes    Diverticulitis     High cholesterol     History of basal cell cancer     forehead    History of colonic polyps     History of prostate cancer     External Beam and brachytherapy    Hypertension     Mild aortic stenosis     PVC (premature ventricular contraction)     Varicella         Past Surgical History:   Procedure Laterality Date    CAROTID ENDARTERECTOMY Right 12/15/14    COLONOSCOPY  2012    LYMPH NODE DISSECTION      PACEMAKER  2019        Family History   Problem Relation Age of Onset    Cancer Mother         colon    Heart Disease Mother     Cancer Father         lung    Heart Disease Sister         Social History     Socioeconomic History    Marital status:    Tobacco Use    Smoking status: Former     Packs/day: 0.25     Years: 15.00     Pack years: 3.75     Types: Cigarettes     Quit date: 1976     Years since quittin.0    Smokeless tobacco: Never   Substance and Sexual Activity    Alcohol use: Yes     Alcohol/week: 4.0 - 5.0 standard drinks    Drug use: No     Social Determinants of Health     Financial Resource Strain: Low Risk     Difficulty of Paying Living Expenses: Not hard at all   Food Insecurity: No Food Insecurity    Worried About Running Out of Food in the Last Year: Never true    Ran Out of Food in the Last Year: Never true         Patient has no known allergies.      Previous Medications    ABIRATERONE ACETATE (ZYTIGA) 250 MG TABLET    Take 4 tablets (1,000 mg total) by mouth once daily Take on an empty stomach. AMLODIPINE (NORVASC) 5 MG TABLET    TAKE 1 TABLET BY MOUTH IN THE MORNING FOR HIGH BLOOD PRESSURE    ATORVASTATIN (LIPITOR) 20 MG TABLET    Take 1 tablet by mouth daily Take 1 tablet by mouth nightly    LOSARTAN-HYDROCHLOROTHIAZIDE (HYZAAR) 100-12.5 MG PER TABLET    Take 1 tablet by mouth Daily with supper    PREDNISONE (DELTASONE) 5 MG TABLET    TAKE 1 TABLET BY MOUTH IN THE MORNING FOR 10 DAYS    XARELTO 20 MG TABS TABLET    TAKE 1 TABLET BY MOUTH ONCE DAILY WITH SUPPER        Vitals signs and nursing note reviewed. Patient Vitals for the past 4 hrs:   Temp Pulse Resp BP SpO2   01/22/23 1800 -- -- -- (!) 180/86 97 %   01/22/23 1741 -- 64 -- -- --   01/22/23 1710 98.2 °F (36.8 °C) 69 16 (!) 183/92 98 %          Physical Exam  Vitals and nursing note reviewed. Constitutional:       Appearance: Normal appearance. HENT:      Head: Normocephalic and atraumatic. Mouth/Throat:      Mouth: Mucous membranes are moist.   Eyes:      Extraocular Movements: Extraocular movements intact. Pupils: Pupils are equal, round, and reactive to light. Cardiovascular:      Rate and Rhythm: Normal rate. Pulmonary:      Effort: Pulmonary effort is normal.      Breath sounds: Normal breath sounds. Abdominal:      Palpations: Abdomen is soft. Tenderness: There is abdominal tenderness (Mild left lower quadrant tenderness to palpation). Musculoskeletal:         General: Normal range of motion. Cervical back: Normal range of motion. Skin:     General: Skin is warm and dry. Neurological:      General: No focal deficit present. Mental Status: He is alert and oriented to person, place, and time. Psychiatric:         Mood and Affect: Mood normal.         Behavior: Behavior normal.         Thought Content:  Thought content normal.         Judgment: Judgment normal.          Results for orders placed or performed during the hospital encounter of 01/22/23   CT ABDOMEN PELVIS RENAL STONE Narrative    CT of the abdomen and pelvis without contrast.    CLINICAL INDICATION: Hematuria, left flank pain, prior history of kidney stones    PROCEDURE: Serial thin-section axial images obtained from the upper abdomen  through the proximal femurs without the administration of intravenous or oral  contrast.  Radiation dose reduction techniques were used for this study. Our CT  scanners use one or all of the following: Automated exposure control, adjusted  of the mA and/or kV according to patient size, iterative reconstruction    COMPARISON: CT abdomen and pelvis dated 12/6/2017    FINDINGS:  Evaluation of the hollow and solid viscera is limited by the lack of  intravenous contrast.    Coarsening of interstitial markings at the lung bases. CT ABDOMEN: There is moderate left-sided hydronephrosis and hydroureter with  periureteric inflammatory fat stranding to the level of an obstructing 3 mm  distal ureteral stone just proximal to the UVJ. There is a tiny 1 mm  nonobstructing left parapelvic renal stone. There is a 3 mm nonobstructing right  parapelvic renal stone. No right-sided hydronephrosis. No gallstones evident. The adrenal glands are normal. The appendix is normal.    CT PELVIS: The bladder is well-distended with smooth thin walls. The UVJs are  normal. The distal right ureter is normal. Rectum is normal. There is no  inguinal hernia or adenopathy. Radiation seeds are noted in the prostate gland. No aggressive osseous lesions identified. Impression    1. Moderate left-sided hydronephrosis and hydroureter to the level of an  obstructing 3 mm distal ureteral stone. 2. Tiny 1 mm nonobstructing left parapelvic renal stone with a 3 mm  nonobstructing right parapelvic renal stone.      Urinalysis w rflx microscopic   Result Value Ref Range    Color, UA RED      Appearance TURBID      Specific Gravity, UA >=1.030 1.001 - 1.023    pH, Urine 6.0 5.0 - 9.0      Protein,  (A) NEG mg/dL    Glucose, UA Negative mg/dL    Ketones, Urine TRACE (A) NEG mg/dL    Bilirubin Urine Negative NEG      Blood, Urine LARGE (A) NEG      Urobilinogen, Urine 1.0 0.2 - 1.0 EU/dL    Nitrite, Urine Negative NEG      Leukocyte Esterase, Urine Negative NEG     Urinalysis, Micro   Result Value Ref Range    WBC, UA 0-3 0 /hpf    RBC, UA >100 0 /hpf    Epithelial Cells UA 0 0 /hpf    BACTERIA, URINE 0 0 /hpf    Casts 0 0 /lpf    Crystals 0 0 /LPF    Mucus, UA TRACE 0 /lpf    Other observations RESULTS VERIFIED MANUALLY     CBC with Auto Differential   Result Value Ref Range    WBC 8.7 4.3 - 11.1 K/uL    RBC 4.56 4.23 - 5.60 M/uL    Hemoglobin 14.4 13.6 - 17.2 g/dL    Hematocrit 41.6 41.1 - 50.3 %    MCV 91.2 82.0 - 102.0 FL    MCH 31.6 26.1 - 32.9 PG    MCHC 34.6 31.4 - 35.0 g/dL    RDW 13.1 11.9 - 14.6 %    Platelets 806 275 - 139 K/uL    MPV 9.3 (L) 9.4 - 12.3 FL    nRBC 0.00 0.0 - 0.2 K/uL    Differential Type AUTOMATED      Seg Neutrophils 86 (H) 43 - 78 %    Lymphocytes 7 (L) 13 - 44 %    Monocytes 6 4.0 - 12.0 %    Eosinophils % 0 (L) 0.5 - 7.8 %    Basophils 0 0.0 - 2.0 %    Immature Granulocytes 1 0.0 - 5.0 %    Segs Absolute 7.5 1.7 - 8.2 K/UL    Absolute Lymph # 0.6 0.5 - 4.6 K/UL    Absolute Mono # 0.6 0.1 - 1.3 K/UL    Absolute Eos # 0.0 0.0 - 0.8 K/UL    Basophils Absolute 0.0 0.0 - 0.2 K/UL    Absolute Immature Granulocyte 0.0 0.0 - 0.5 K/UL   Comprehensive Metabolic Panel   Result Value Ref Range    Sodium 139 133 - 143 mmol/L    Potassium 4.7 3.5 - 5.1 mmol/L    Chloride 101 98 - 107 mmol/L    CO2 29 21 - 32 mmol/L    Anion Gap 9 2 - 11 mmol/L    Glucose 136 (H) 65 - 100 mg/dL    BUN 18 7.0 - 18.0 MG/DL    Creatinine 0.83 0.8 - 1.5 MG/DL    Est, Glom Filt Rate >60 >60 ml/min/1.73m2    Calcium 10.0 8.3 - 10.4 MG/DL    Total Bilirubin 0.6 0.2 - 1.1 MG/DL    ALT 25 13.0 - 61.0 U/L    AST 26 15 - 37 U/L    Alk Phosphatase 80 45.0 - 117.0 U/L    Total Protein 7.1 6.4 - 8.2 g/dL    Albumin 4.6 3.2 - 4.6 g/dL    Globulin 2.5 (L) 2.8 - 4.5 g/dL    Albumin/Globulin Ratio 1.8 (H) 0.4 - 1.6     Lipase   Result Value Ref Range    Lipase 24 13 - 60 U/L   Lactic Acid   Result Value Ref Range    Lactic Acid 1.50 0.4 - 2.0 mmol/L        CT ABDOMEN PELVIS RENAL STONE   Final Result   1. Moderate left-sided hydronephrosis and hydroureter to the level of an   obstructing 3 mm distal ureteral stone. 2. Tiny 1 mm nonobstructing left parapelvic renal stone with a 3 mm   nonobstructing right parapelvic renal stone. Voice dictation software was used during the making of this note. This software is not perfect and grammatical and other typographical errors may be present. This note has not been completely proofread for errors.        Royer Jolley MD  01/22/23 7274

## 2023-01-22 NOTE — ED TRIAGE NOTES
Patient ambulatory upon arrival states he has hematuria that started this morning. Patient denies any burning on urination or urinary frequency. Patient does advise he has some abdominal pain in mid to lower left area of abdomen.

## 2023-01-23 NOTE — ED NOTES
I have reviewed discharge instructions with the patient. The patient verbalized understanding. Patient left ED via Discharge Method: ambulatory to Home with spouse. Opportunity for questions and clarification provided. Patient given 3 scripts. To continue your aftercare when you leave the hospital, you may receive an automated call from our care team to check in on how you are doing. This is a free service and part of our promise to provide the best care and service to meet your aftercare needs.  If you have questions, or wish to unsubscribe from this service please call [phone]. Thank you for Choosing our [facility] Emergency Department.       Deshaun Aceves RN  01/22/23 6209

## 2023-01-23 NOTE — DISCHARGE INSTRUCTIONS
Return to the emergency department for uncontrolled pain, uncontrolled vomiting, fever or any other concerns. Hold Xarelto for 2 days. Call tomorrow morning to set up follow-up appointment with urology.

## 2023-03-10 DIAGNOSIS — Z95.0 PACEMAKER: ICD-10-CM

## 2023-03-10 DIAGNOSIS — R00.1 BRADYCARDIA: ICD-10-CM

## 2023-03-29 ENCOUNTER — OFFICE VISIT (OUTPATIENT)
Dept: VASCULAR SURGERY | Age: 78
End: 2023-03-29
Payer: MEDICARE

## 2023-03-29 VITALS
HEIGHT: 70 IN | OXYGEN SATURATION: 98 % | WEIGHT: 174 LBS | SYSTOLIC BLOOD PRESSURE: 139 MMHG | BODY MASS INDEX: 24.91 KG/M2 | HEART RATE: 77 BPM | DIASTOLIC BLOOD PRESSURE: 76 MMHG

## 2023-03-29 DIAGNOSIS — Z98.890 H/O CAROTID ENDARTERECTOMY: ICD-10-CM

## 2023-03-29 DIAGNOSIS — I65.22 STENOSIS OF LEFT CAROTID ARTERY: Primary | ICD-10-CM

## 2023-03-29 PROCEDURE — G8484 FLU IMMUNIZE NO ADMIN: HCPCS | Performed by: NURSE PRACTITIONER

## 2023-03-29 PROCEDURE — 3075F SYST BP GE 130 - 139MM HG: CPT | Performed by: NURSE PRACTITIONER

## 2023-03-29 PROCEDURE — 3078F DIAST BP <80 MM HG: CPT | Performed by: NURSE PRACTITIONER

## 2023-03-29 PROCEDURE — G8427 DOCREV CUR MEDS BY ELIG CLIN: HCPCS | Performed by: NURSE PRACTITIONER

## 2023-03-29 PROCEDURE — 99213 OFFICE O/P EST LOW 20 MIN: CPT | Performed by: NURSE PRACTITIONER

## 2023-03-29 PROCEDURE — G8420 CALC BMI NORM PARAMETERS: HCPCS | Performed by: NURSE PRACTITIONER

## 2023-03-29 PROCEDURE — 1123F ACP DISCUSS/DSCN MKR DOCD: CPT | Performed by: NURSE PRACTITIONER

## 2023-03-29 PROCEDURE — 1036F TOBACCO NON-USER: CPT | Performed by: NURSE PRACTITIONER

## 2023-03-29 NOTE — PROGRESS NOTES
cm/s          Vertebral 81.6 cm/s        12 cm/s        34.7 cm/s        6.86 cm/s            Arterial Pressure Measurements     Right Left   Brachial  mmHg        139 mmHg                                    Procedure Staff    Technologist/Clinician: Annie Das  Supporting Staff: None  Performing Physician/Midlevel: None     Exam Completion Date/Time: 3/29/23  9:31 AM      PHYSICAL EXAM  VITALS: /76 (Site: Right Upper Arm)   Pulse 77   Ht 5' 10\" (1.778 m)   Wt 174 lb (78.9 kg)   SpO2 98%   BMI 24.97 kg/m²       GENERAL: Well developed, well nourished, in NAD  HEAD/NECK: normocephalic, atraumatic, neck supple  MUSCULOSKELETAL: normal gait  NEURO: sensation and strength grossly intact and symmetrical  PSYCH: alert and oriented to person, place and time      Assessment/Plan: Patient is a 73-year-old male who follows up for 6-month carotid duplex study. No new lateralizing neurological symptoms. He remains on Xarelto and atorvastatin. Duplex study is stable with asymptomatic left carotid stenosis less than 70%. He is post right carotid endarterectomy. We will continue to follow him with 6-month duplex studies. He is to present to the emergency department with any S/S of a stroke i.e. slurred speech, facial drooping, amaurosis fugax or unilateral weakness.         ARON Fletcher CNP    20 minutes of time was spent on this encounter including chart review, assessment and evaluation

## 2023-04-06 DIAGNOSIS — C61 MALIGNANT NEOPLASM OF PROSTATE (HCC): ICD-10-CM

## 2023-04-07 LAB — PSA SERPL-MCNC: <0.1 NG/ML

## 2023-04-17 ENCOUNTER — OFFICE VISIT (OUTPATIENT)
Dept: UROLOGY | Age: 78
End: 2023-04-17
Payer: MEDICARE

## 2023-04-17 DIAGNOSIS — N40.1 BENIGN PROSTATIC HYPERPLASIA WITH LOWER URINARY TRACT SYMPTOMS, SYMPTOM DETAILS UNSPECIFIED: ICD-10-CM

## 2023-04-17 DIAGNOSIS — C61 MALIGNANT NEOPLASM OF PROSTATE (HCC): Primary | ICD-10-CM

## 2023-04-17 LAB
BILIRUBIN, URINE, POC: NEGATIVE
BLOOD URINE, POC: NEGATIVE
GLUCOSE URINE, POC: NEGATIVE
KETONES, URINE, POC: NEGATIVE
LEUKOCYTE ESTERASE, URINE, POC: NEGATIVE
NITRITE, URINE, POC: NEGATIVE
PH, URINE, POC: 6.5 (ref 4.6–8)
PROTEIN,URINE, POC: NEGATIVE
SPECIFIC GRAVITY, URINE, POC: 1.02 (ref 1–1.03)
URINALYSIS CLARITY, POC: NORMAL
URINALYSIS COLOR, POC: NORMAL
UROBILINOGEN, POC: NORMAL

## 2023-04-17 PROCEDURE — G8420 CALC BMI NORM PARAMETERS: HCPCS | Performed by: NURSE PRACTITIONER

## 2023-04-17 PROCEDURE — G8428 CUR MEDS NOT DOCUMENT: HCPCS | Performed by: NURSE PRACTITIONER

## 2023-04-17 PROCEDURE — 1123F ACP DISCUSS/DSCN MKR DOCD: CPT | Performed by: NURSE PRACTITIONER

## 2023-04-17 PROCEDURE — 1036F TOBACCO NON-USER: CPT | Performed by: NURSE PRACTITIONER

## 2023-04-17 PROCEDURE — 81003 URINALYSIS AUTO W/O SCOPE: CPT | Performed by: NURSE PRACTITIONER

## 2023-04-17 PROCEDURE — 99213 OFFICE O/P EST LOW 20 MIN: CPT | Performed by: NURSE PRACTITIONER

## 2023-04-17 ASSESSMENT — ENCOUNTER SYMPTOMS
NAUSEA: 0
BACK PAIN: 0

## 2023-04-17 NOTE — PROGRESS NOTES
[facility] [address]  [address] [place] [address], [address]  [phone]          Jacqueline Jay  : 1945    Chief Complaint   Patient presents with    Follow-up    Prostate Cancer          HPI     Jacqueline Jay is a 69 y.o. male    Follow-up on CaP. He has also had episode of gross hematuria. Previous hematuria work up on 2017 revealed a tiny R renal stone and PU stone which he subsequently passed. Denies any recurrrent gross hematuria. S/P combined EBRT (24tx) and brachytherapy with short course HAT which was completed on 13 in [address] [place] [address]. PSA was und 14; 0.125 on 5/8/15; 0.077 on 11/10/15; 0.104 on 6/3/16; 0.117 on 16; 0.138 on 17; 0.155 on 18;  0.167 on 18; 0.229 on 19; 0.395 on 20; 0.5 on 21; 0.865 on 21 and 1.6 on 22. PMSA PET scan completed on 3/21/22 showed L external and common iliac lymphadenopathy consistent with metastatic disease. Completed salvage EBRT under direction of Dr. Tye Conte on 22. Remains on ADT and abiraterone under direction of Dr. Farzana Josue. Reports stable nocturia 1x per night off Proscar. Previously stopped Flomax due to concerns with dizziness. Reports ED but uninterested in tx. Retired . PSA is <0.1 on 10/7/22. PSA <0.1      No longer having to see Dr. Mar David.   He does follow-up with Dr. Nupur Garza he believes next month    Past Medical History:   Diagnosis Date    Aortic valve sclerosis     Bradycardia     Bundle branch block          Cancer (HCC)     prostate and lymph nodes    Diverticulitis     High cholesterol     History of basal cell cancer     forehead    History of colonic polyps     History of prostate cancer     External Beam and brachytherapy    Hypertension     Mild aortic stenosis     PVC (premature ventricular contraction)     Varicella      Past Surgical History:   Procedure Laterality Date    CAROTID ENDARTERECTOMY Right 12/15/14    COLONOSCOPY  2012    LYMPH NODE DISSECTION

## 2023-04-26 PROCEDURE — 93296 REM INTERROG EVL PM/IDS: CPT | Performed by: INTERNAL MEDICINE

## 2023-04-26 PROCEDURE — 93294 REM INTERROG EVL PM/LDLS PM: CPT | Performed by: INTERNAL MEDICINE

## 2023-05-13 SDOH — ECONOMIC STABILITY: FOOD INSECURITY: WITHIN THE PAST 12 MONTHS, THE FOOD YOU BOUGHT JUST DIDN'T LAST AND YOU DIDN'T HAVE MONEY TO GET MORE.: NEVER TRUE

## 2023-05-13 SDOH — ECONOMIC STABILITY: HOUSING INSECURITY
IN THE LAST 12 MONTHS, WAS THERE A TIME WHEN YOU DID NOT HAVE A STEADY PLACE TO SLEEP OR SLEPT IN A SHELTER (INCLUDING NOW)?: NO

## 2023-05-13 SDOH — ECONOMIC STABILITY: TRANSPORTATION INSECURITY
IN THE PAST 12 MONTHS, HAS LACK OF TRANSPORTATION KEPT YOU FROM MEETINGS, WORK, OR FROM GETTING THINGS NEEDED FOR DAILY LIVING?: NO

## 2023-05-13 SDOH — ECONOMIC STABILITY: INCOME INSECURITY: HOW HARD IS IT FOR YOU TO PAY FOR THE VERY BASICS LIKE FOOD, HOUSING, MEDICAL CARE, AND HEATING?: NOT HARD AT ALL

## 2023-05-13 SDOH — ECONOMIC STABILITY: FOOD INSECURITY: WITHIN THE PAST 12 MONTHS, YOU WORRIED THAT YOUR FOOD WOULD RUN OUT BEFORE YOU GOT MONEY TO BUY MORE.: NEVER TRUE

## 2023-05-16 ENCOUNTER — OFFICE VISIT (OUTPATIENT)
Dept: INTERNAL MEDICINE CLINIC | Facility: CLINIC | Age: 78
End: 2023-05-16
Payer: MEDICARE

## 2023-05-16 VITALS
HEIGHT: 70 IN | BODY MASS INDEX: 25.05 KG/M2 | SYSTOLIC BLOOD PRESSURE: 153 MMHG | DIASTOLIC BLOOD PRESSURE: 82 MMHG | HEART RATE: 57 BPM | OXYGEN SATURATION: 100 % | WEIGHT: 175 LBS | TEMPERATURE: 97.4 F

## 2023-05-16 DIAGNOSIS — I10 ESSENTIAL HYPERTENSION: ICD-10-CM

## 2023-05-16 DIAGNOSIS — E78.49 OTHER HYPERLIPIDEMIA: ICD-10-CM

## 2023-05-16 PROCEDURE — G8419 CALC BMI OUT NRM PARAM NOF/U: HCPCS | Performed by: INTERNAL MEDICINE

## 2023-05-16 PROCEDURE — 1036F TOBACCO NON-USER: CPT | Performed by: INTERNAL MEDICINE

## 2023-05-16 PROCEDURE — 3079F DIAST BP 80-89 MM HG: CPT | Performed by: INTERNAL MEDICINE

## 2023-05-16 PROCEDURE — G8427 DOCREV CUR MEDS BY ELIG CLIN: HCPCS | Performed by: INTERNAL MEDICINE

## 2023-05-16 PROCEDURE — 3077F SYST BP >= 140 MM HG: CPT | Performed by: INTERNAL MEDICINE

## 2023-05-16 PROCEDURE — 99214 OFFICE O/P EST MOD 30 MIN: CPT | Performed by: INTERNAL MEDICINE

## 2023-05-16 PROCEDURE — 1123F ACP DISCUSS/DSCN MKR DOCD: CPT | Performed by: INTERNAL MEDICINE

## 2023-05-16 RX ORDER — ATORVASTATIN CALCIUM 20 MG/1
TABLET, FILM COATED ORAL
Qty: 90 TABLET | Refills: 1 | Status: SHIPPED | OUTPATIENT
Start: 2023-05-16

## 2023-05-16 RX ORDER — AMLODIPINE BESYLATE 5 MG/1
TABLET ORAL
Qty: 90 TABLET | Refills: 1 | Status: SHIPPED | OUTPATIENT
Start: 2023-05-16 | End: 2023-05-16

## 2023-05-16 RX ORDER — LOSARTAN POTASSIUM AND HYDROCHLOROTHIAZIDE 12.5; 1 MG/1; MG/1
1 TABLET ORAL
Qty: 90 TABLET | Refills: 1 | Status: SHIPPED | OUTPATIENT
Start: 2023-05-16

## 2023-05-16 ASSESSMENT — PATIENT HEALTH QUESTIONNAIRE - PHQ9
SUM OF ALL RESPONSES TO PHQ9 QUESTIONS 1 & 2: 0
SUM OF ALL RESPONSES TO PHQ QUESTIONS 1-9: 0
SUM OF ALL RESPONSES TO PHQ QUESTIONS 1-9: 0
2. FEELING DOWN, DEPRESSED OR HOPELESS: 0
SUM OF ALL RESPONSES TO PHQ QUESTIONS 1-9: 0
SUM OF ALL RESPONSES TO PHQ QUESTIONS 1-9: 0
1. LITTLE INTEREST OR PLEASURE IN DOING THINGS: 0

## 2023-05-16 ASSESSMENT — ANXIETY QUESTIONNAIRES
1. FEELING NERVOUS, ANXIOUS, OR ON EDGE: 0
IF YOU CHECKED OFF ANY PROBLEMS ON THIS QUESTIONNAIRE, HOW DIFFICULT HAVE THESE PROBLEMS MADE IT FOR YOU TO DO YOUR WORK, TAKE CARE OF THINGS AT HOME, OR GET ALONG WITH OTHER PEOPLE: NOT DIFFICULT AT ALL
4. TROUBLE RELAXING: 0
2. NOT BEING ABLE TO STOP OR CONTROL WORRYING: 0
GAD7 TOTAL SCORE: 0
3. WORRYING TOO MUCH ABOUT DIFFERENT THINGS: 0
7. FEELING AFRAID AS IF SOMETHING AWFUL MIGHT HAPPEN: 0
6. BECOMING EASILY ANNOYED OR IRRITABLE: 0
5. BEING SO RESTLESS THAT IT IS HARD TO SIT STILL: 0

## 2023-05-16 NOTE — PROGRESS NOTES
Whitney Solis M.D. Internal Medicine  [address] [facility] [place] , [address]  Office : [phone]  Fax : [phone]    Chief Complaint   Patient presents with    Hypertension     4 mo follow up       History of Present Illness:  Ольга Granado is a 69 y.o. male. HPI    Hypertension  Patient is in for Hypertension which is stable. Diet and Lifestyle: generally follows a low sodium diet  exercises sporadically  nonsmoker  Home BP Monitoring: is well controlled at home, ranging 120-130's/70's, Reviewed blood pressure diary with patient at todays visit. .Patient's recent blood pressures were   BP Readings from Last 3 Encounters:   05/16/23 (!) 153/82   04/11/23 (!) 173/84   03/29/23 139/76   . taking medications as instructed, no medication side effects noted, no TIA's, no chest pain on exertion, no dyspnea on exertion, no swelling of ankles. Cardiac risk factors consist of dyslipidemia, hypertension, and male gender. Lab Results   Component Value Date/Time     01/22/2023 05:52 PM    K 4.7 01/22/2023 05:52 PM     01/22/2023 05:52 PM    CO2 29 01/22/2023 05:52 PM    BUN 18 01/22/2023 05:52 PM    CREATININE 0.83 01/22/2023 05:52 PM    GLUCOSE 136 01/22/2023 05:52 PM    CALCIUM 10.0 01/22/2023 05:52 PM    LABGLOM >60 01/22/2023 05:52 PM        Hyperlipidemia  Patient is in for follow-up for hyperlipidemia. Diet and Lifestyle: generally follows a low fat low cholesterol diet. Risk factors for vascular disease consist of hyperlipidemia and hypertension. Last LDL was   Lab Results   Component Value Date    LDLCALC 71 09/14/2022   . Last ALT was   Lab Results   Component Value Date/Time    ALT 25 01/22/2023 05:52 PM   .  No muscle aches. Prostate cancer  Has recurred after 5 years from original diagnosis in lymph nodes. Current treatment is Eligard, abiraterone and prednisone.   Finished RT  Lab Results   Component Value Date    PSA <0.1

## 2023-06-16 PROBLEM — I48.19 PERSISTENT ATRIAL FIBRILLATION (HCC): Status: ACTIVE | Noted: 2023-06-16

## 2023-06-21 DIAGNOSIS — Z95.0 PACEMAKER: ICD-10-CM

## 2023-06-21 DIAGNOSIS — R00.1 BRADYCARDIA: ICD-10-CM

## 2023-07-25 PROCEDURE — 93294 REM INTERROG EVL PM/LDLS PM: CPT | Performed by: INTERNAL MEDICINE

## 2023-07-25 PROCEDURE — 93296 REM INTERROG EVL PM/IDS: CPT | Performed by: INTERNAL MEDICINE

## 2023-09-06 ENCOUNTER — OFFICE VISIT (OUTPATIENT)
Dept: INTERNAL MEDICINE CLINIC | Facility: CLINIC | Age: 78
End: 2023-09-06
Payer: MEDICARE

## 2023-09-06 VITALS
TEMPERATURE: 97.5 F | HEART RATE: 65 BPM | WEIGHT: 170 LBS | HEIGHT: 70 IN | DIASTOLIC BLOOD PRESSURE: 74 MMHG | BODY MASS INDEX: 24.34 KG/M2 | SYSTOLIC BLOOD PRESSURE: 133 MMHG | OXYGEN SATURATION: 98 %

## 2023-09-06 DIAGNOSIS — C61 PROSTATE CANCER (HCC): ICD-10-CM

## 2023-09-06 DIAGNOSIS — I35.0 MILD AORTIC STENOSIS: ICD-10-CM

## 2023-09-06 DIAGNOSIS — E78.49 OTHER HYPERLIPIDEMIA: Primary | ICD-10-CM

## 2023-09-06 DIAGNOSIS — I10 ESSENTIAL HYPERTENSION: ICD-10-CM

## 2023-09-06 PROCEDURE — 1123F ACP DISCUSS/DSCN MKR DOCD: CPT | Performed by: INTERNAL MEDICINE

## 2023-09-06 PROCEDURE — 3075F SYST BP GE 130 - 139MM HG: CPT | Performed by: INTERNAL MEDICINE

## 2023-09-06 PROCEDURE — G8427 DOCREV CUR MEDS BY ELIG CLIN: HCPCS | Performed by: INTERNAL MEDICINE

## 2023-09-06 PROCEDURE — 3078F DIAST BP <80 MM HG: CPT | Performed by: INTERNAL MEDICINE

## 2023-09-06 PROCEDURE — 1036F TOBACCO NON-USER: CPT | Performed by: INTERNAL MEDICINE

## 2023-09-06 PROCEDURE — 99214 OFFICE O/P EST MOD 30 MIN: CPT | Performed by: INTERNAL MEDICINE

## 2023-09-06 PROCEDURE — G8420 CALC BMI NORM PARAMETERS: HCPCS | Performed by: INTERNAL MEDICINE

## 2023-09-06 RX ORDER — LOSARTAN POTASSIUM AND HYDROCHLOROTHIAZIDE 12.5; 1 MG/1; MG/1
1 TABLET ORAL
Qty: 90 TABLET | Refills: 1 | Status: SHIPPED | OUTPATIENT
Start: 2023-09-06

## 2023-09-06 RX ORDER — ATORVASTATIN CALCIUM 20 MG/1
TABLET, FILM COATED ORAL
Qty: 90 TABLET | Refills: 1 | Status: SHIPPED | OUTPATIENT
Start: 2023-09-06

## 2023-09-06 SDOH — ECONOMIC STABILITY: INCOME INSECURITY: HOW HARD IS IT FOR YOU TO PAY FOR THE VERY BASICS LIKE FOOD, HOUSING, MEDICAL CARE, AND HEATING?: NOT HARD AT ALL

## 2023-09-06 SDOH — ECONOMIC STABILITY: FOOD INSECURITY: WITHIN THE PAST 12 MONTHS, THE FOOD YOU BOUGHT JUST DIDN'T LAST AND YOU DIDN'T HAVE MONEY TO GET MORE.: NEVER TRUE

## 2023-09-06 SDOH — ECONOMIC STABILITY: FOOD INSECURITY: WITHIN THE PAST 12 MONTHS, YOU WORRIED THAT YOUR FOOD WOULD RUN OUT BEFORE YOU GOT MONEY TO BUY MORE.: NEVER TRUE

## 2023-09-06 ASSESSMENT — PATIENT HEALTH QUESTIONNAIRE - PHQ9
SUM OF ALL RESPONSES TO PHQ9 QUESTIONS 1 & 2: 0
SUM OF ALL RESPONSES TO PHQ QUESTIONS 1-9: 0
2. FEELING DOWN, DEPRESSED OR HOPELESS: 0
1. LITTLE INTEREST OR PLEASURE IN DOING THINGS: 0
SUM OF ALL RESPONSES TO PHQ QUESTIONS 1-9: 0

## 2023-09-06 ASSESSMENT — ENCOUNTER SYMPTOMS: SHORTNESS OF BREATH: 0

## 2023-09-06 ASSESSMENT — ANXIETY QUESTIONNAIRES
5. BEING SO RESTLESS THAT IT IS HARD TO SIT STILL: 0
6. BECOMING EASILY ANNOYED OR IRRITABLE: 0
IF YOU CHECKED OFF ANY PROBLEMS ON THIS QUESTIONNAIRE, HOW DIFFICULT HAVE THESE PROBLEMS MADE IT FOR YOU TO DO YOUR WORK, TAKE CARE OF THINGS AT HOME, OR GET ALONG WITH OTHER PEOPLE: NOT DIFFICULT AT ALL
3. WORRYING TOO MUCH ABOUT DIFFERENT THINGS: 0
GAD7 TOTAL SCORE: 0
7. FEELING AFRAID AS IF SOMETHING AWFUL MIGHT HAPPEN: 0
1. FEELING NERVOUS, ANXIOUS, OR ON EDGE: 0
2. NOT BEING ABLE TO STOP OR CONTROL WORRYING: 0
4. TROUBLE RELAXING: 0

## 2023-09-06 NOTE — PROGRESS NOTES
02/12/2021         Carotid Stenosis  Followed by vascular surgery. Had carotid doppler in  March 2022    Afib S/p Atrial Flutter  Had successful RF ablation for aflutter and stopped DOAC. Cardiology is monitoring pacemaker and mild aortic stenosis. Last echo 1/2021. Back on DOAC likely long term      Past Medical History:  Past Medical History:   Diagnosis Date    Aortic valve sclerosis     Bradycardia     Bundle branch block          Cancer (HCC)     prostate and lymph nodes    Diverticulitis     High cholesterol     History of basal cell cancer     forehead    History of colonic polyps     History of prostate cancer 2013    External Beam and brachytherapy    Hypertension     Mild aortic stenosis     PVC (premature ventricular contraction)     Varicella      Past Surgical History:  Past Surgical History:   Procedure Laterality Date    CAROTID ENDARTERECTOMY Right 12/15/14    COLONOSCOPY  11/2012    LYMPH NODE DISSECTION      PACEMAKER  04/28/2019     Allergies:   No Known Allergies  Medications:   Current Outpatient Medications   Medication Sig Dispense Refill    atorvastatin (LIPITOR) 20 MG tablet Take 1 tablet by mouth nightly 90 tablet 1    losartan-hydroCHLOROthiazide (HYZAAR) 100-12.5 MG per tablet Take 1 tablet by mouth Daily with supper 90 tablet 1    Multiple Vitamins-Minerals (PRESERVISION AREDS 2 PO) Take by mouth      vitamin D (CHOLECALCIFEROL) 125 MCG (5000 UT) CAPS capsule Take 1 capsule by mouth daily      XARELTO 20 MG TABS tablet TAKE 1 TABLET BY MOUTH ONCE DAILY WITH SUPPER 90 tablet 3    abiraterone acetate (ZYTIGA) 250 MG tablet Indications: 3 tablets      predniSONE (DELTASONE) 5 MG tablet TAKE 1 TABLET BY MOUTH IN THE MORNING FOR 10 DAYS       No current facility-administered medications for this visit.      Social History:  Social History     Tobacco Use    Smoking status: Former     Packs/day: 0.25     Years: 15.00     Pack years: 3.75     Types: Cigarettes     Quit date: 1/1/1976

## 2023-09-26 DIAGNOSIS — Z95.0 PACEMAKER: Primary | ICD-10-CM

## 2023-09-26 DIAGNOSIS — I48.19 PERSISTENT ATRIAL FIBRILLATION (HCC): ICD-10-CM

## 2023-09-26 DIAGNOSIS — I44.2 COMPLETE HEART BLOCK (HCC): ICD-10-CM

## 2023-09-27 ENCOUNTER — OFFICE VISIT (OUTPATIENT)
Dept: VASCULAR SURGERY | Age: 78
End: 2023-09-27
Payer: MEDICARE

## 2023-09-27 VITALS
WEIGHT: 175 LBS | HEIGHT: 70 IN | SYSTOLIC BLOOD PRESSURE: 163 MMHG | HEART RATE: 71 BPM | BODY MASS INDEX: 25.05 KG/M2 | DIASTOLIC BLOOD PRESSURE: 82 MMHG | OXYGEN SATURATION: 98 %

## 2023-09-27 DIAGNOSIS — Z98.890 H/O CAROTID ENDARTERECTOMY: ICD-10-CM

## 2023-09-27 DIAGNOSIS — I65.22 STENOSIS OF LEFT CAROTID ARTERY: Primary | ICD-10-CM

## 2023-09-27 PROCEDURE — 1123F ACP DISCUSS/DSCN MKR DOCD: CPT | Performed by: NURSE PRACTITIONER

## 2023-09-27 PROCEDURE — G8419 CALC BMI OUT NRM PARAM NOF/U: HCPCS | Performed by: NURSE PRACTITIONER

## 2023-09-27 PROCEDURE — G8427 DOCREV CUR MEDS BY ELIG CLIN: HCPCS | Performed by: NURSE PRACTITIONER

## 2023-09-27 PROCEDURE — 3077F SYST BP >= 140 MM HG: CPT | Performed by: NURSE PRACTITIONER

## 2023-09-27 PROCEDURE — 1036F TOBACCO NON-USER: CPT | Performed by: NURSE PRACTITIONER

## 2023-09-27 PROCEDURE — 99213 OFFICE O/P EST LOW 20 MIN: CPT | Performed by: NURSE PRACTITIONER

## 2023-09-27 PROCEDURE — 3078F DIAST BP <80 MM HG: CPT | Performed by: NURSE PRACTITIONER

## 2023-09-27 NOTE — PROGRESS NOTES
DATE OF VISIT: 9/27/2023      [facility]  Angelique Fulton is a 69 y.o. male follows up for his 6-month carotid duplex study. He denies any new lateralizing neurological symptoms. He remains on Xarelto and atorvastatin. He has undergone a right carotid endarterectomy. MEDICAL HISTORY:   Past Medical History:   Diagnosis Date    Aortic valve sclerosis     Bradycardia     Bundle branch block          Cancer (HCC)     prostate and lymph nodes    Diverticulitis     High cholesterol     History of basal cell cancer     forehead    History of colonic polyps     History of prostate cancer 2013    External Beam and brachytherapy    Hypertension     Mild aortic stenosis     PVC (premature ventricular contraction)     Varicella          SURGICAL HISTORY:   Past Surgical History:   Procedure Laterality Date    CAROTID ENDARTERECTOMY Right 12/15/14    COLONOSCOPY  11/2012    LYMPH NODE DISSECTION      PACEMAKER  04/28/2019       Review of Systems:  Constitutional:   Negative for fevers and unexplained weight loss. Respiratory:   Negative for hemoptysis. Cardiovascular:   Negative except as noted in HPI. Musculoskeletal:  Negative for active, unexplained/severe joint pain.       ALLERGIES: No Known Allergies    CURRENT MEDICATIONS:  Current Outpatient Medications   Medication Sig Dispense Refill    atorvastatin (LIPITOR) 20 MG tablet Take 1 tablet by mouth nightly 90 tablet 1    losartan-hydroCHLOROthiazide (HYZAAR) 100-12.5 MG per tablet Take 1 tablet by mouth Daily with supper 90 tablet 1    Multiple Vitamins-Minerals (PRESERVISION AREDS 2 PO) Take by mouth      vitamin D (CHOLECALCIFEROL) 125 MCG (5000 UT) CAPS capsule Take 1 capsule by mouth daily      XARELTO 20 MG TABS tablet TAKE 1 TABLET BY MOUTH ONCE DAILY WITH SUPPER 90 tablet 3    abiraterone acetate (ZYTIGA) 250 MG tablet Indications: 3 tablets      predniSONE (DELTASONE) 5 MG tablet TAKE 1 TABLET BY MOUTH IN THE MORNING FOR 10 DAYS       No current

## 2023-10-20 ENCOUNTER — OFFICE VISIT (OUTPATIENT)
Dept: UROLOGY | Age: 78
End: 2023-10-20
Payer: MEDICARE

## 2023-10-20 DIAGNOSIS — C61 MALIGNANT NEOPLASM OF PROSTATE (HCC): Primary | ICD-10-CM

## 2023-10-20 DIAGNOSIS — C61 PROSTATE CANCER (HCC): ICD-10-CM

## 2023-10-20 LAB
BILIRUBIN, URINE, POC: NEGATIVE
BLOOD URINE, POC: NEGATIVE
GLUCOSE URINE, POC: NEGATIVE
KETONES, URINE, POC: NEGATIVE
LEUKOCYTE ESTERASE, URINE, POC: NEGATIVE
NITRITE, URINE, POC: NEGATIVE
PH, URINE, POC: 6 (ref 4.6–8)
PROTEIN,URINE, POC: NEGATIVE
SPECIFIC GRAVITY, URINE, POC: 1.01 (ref 1–1.03)
URINALYSIS CLARITY, POC: NORMAL
URINALYSIS COLOR, POC: NORMAL
UROBILINOGEN, POC: NORMAL

## 2023-10-20 PROCEDURE — 1036F TOBACCO NON-USER: CPT | Performed by: UROLOGY

## 2023-10-20 PROCEDURE — 81003 URINALYSIS AUTO W/O SCOPE: CPT | Performed by: UROLOGY

## 2023-10-20 PROCEDURE — G8419 CALC BMI OUT NRM PARAM NOF/U: HCPCS | Performed by: UROLOGY

## 2023-10-20 PROCEDURE — G8484 FLU IMMUNIZE NO ADMIN: HCPCS | Performed by: UROLOGY

## 2023-10-20 PROCEDURE — G8427 DOCREV CUR MEDS BY ELIG CLIN: HCPCS | Performed by: UROLOGY

## 2023-10-20 PROCEDURE — 99214 OFFICE O/P EST MOD 30 MIN: CPT | Performed by: UROLOGY

## 2023-10-20 PROCEDURE — 1123F ACP DISCUSS/DSCN MKR DOCD: CPT | Performed by: UROLOGY

## 2023-10-20 NOTE — PROGRESS NOTES
[facility]  [address] [address]  [phone]    Monserrat Sees  : 1945     HPI   69 y.o., male returns in follow up for gross hematuria and CaP. Previous hematuria work up on 2017 revealed a tiny R renal stone and PU stone which he subsequently passed. Denies any recurrrent gross hematuria. S/P combined EBRT (24tx) and brachytherapy with short course HAT which was completed on 13 in [facility]. PSA was und 14; 0.125 on 5/8/15; 0.077 on 11/10/15; 0.104 on 6/3/16; 0.117 on 16; 0.138 on 17; 0.155 on 18;  0.167 on 18; 0.229 on 19; 0.395 on 20; 0.5 on 21; 0.865 on 21 and 1.6 on 22. PMSA PET scan completed on 3/21/22 showed L external and common iliac lymphadenopathy consistent with metastatic disease. Completed salvage EBRT under direction of Dr. Jamal Cruz on 22. Remains on ADT and abiraterone under direction of Dr. Edwina Morris. Reports stable nocturia 1x per night off Proscar. Previously stopped Flomax due to concerns with dizziness. Reports ED but uninterested in tx. Retired . PSA is und on 23. Reports wife is dealing with progressive dementia.       Past Medical History:   Diagnosis Date    Aortic valve sclerosis     Bradycardia     Bundle branch block          Cancer (HCC)     prostate and lymph nodes    Diverticulitis     High cholesterol     History of basal cell cancer     forehead    History of colonic polyps     History of prostate cancer 2013    External Beam and brachytherapy    Hypertension     Mild aortic stenosis     PVC (premature ventricular contraction)     Varicella      Past Surgical History:   Procedure Laterality Date    CAROTID ENDARTERECTOMY Right 12/15/14    COLONOSCOPY  2012    LYMPH NODE DISSECTION      PACEMAKER  2019     Current Outpatient Medications   Medication Sig Dispense Refill    atorvastatin (LIPITOR) 20 MG tablet Take 1 tablet by mouth nightly 90 tablet 1

## 2024-01-01 ENCOUNTER — PATIENT MESSAGE (OUTPATIENT)
Dept: INTERNAL MEDICINE CLINIC | Facility: CLINIC | Age: 79
End: 2024-01-01

## 2024-01-08 ENCOUNTER — OFFICE VISIT (OUTPATIENT)
Dept: INTERNAL MEDICINE CLINIC | Facility: CLINIC | Age: 79
End: 2024-01-08
Payer: MEDICARE

## 2024-01-08 VITALS
DIASTOLIC BLOOD PRESSURE: 80 MMHG | TEMPERATURE: 97.5 F | HEART RATE: 59 BPM | SYSTOLIC BLOOD PRESSURE: 160 MMHG | HEIGHT: 70 IN | OXYGEN SATURATION: 99 % | BODY MASS INDEX: 25.2 KG/M2 | WEIGHT: 176 LBS

## 2024-01-08 DIAGNOSIS — I10 ESSENTIAL HYPERTENSION: ICD-10-CM

## 2024-01-08 DIAGNOSIS — I48.19 PERSISTENT ATRIAL FIBRILLATION (HCC): ICD-10-CM

## 2024-01-08 DIAGNOSIS — E78.49 OTHER HYPERLIPIDEMIA: ICD-10-CM

## 2024-01-08 DIAGNOSIS — C61 MALIGNANT NEOPLASM OF PROSTATE (HCC): ICD-10-CM

## 2024-01-08 LAB
ALBUMIN SERPL-MCNC: 3.8 G/DL (ref 3.2–4.6)
ALBUMIN/GLOB SERPL: 1.5 (ref 0.4–1.6)
ALP SERPL-CCNC: 66 U/L (ref 50–136)
ALT SERPL-CCNC: 27 U/L (ref 12–65)
ANION GAP SERPL CALC-SCNC: 4 MMOL/L (ref 2–11)
AST SERPL-CCNC: 21 U/L (ref 15–37)
BASOPHILS # BLD: 0 K/UL (ref 0–0.2)
BASOPHILS NFR BLD: 1 % (ref 0–2)
BILIRUB DIRECT SERPL-MCNC: 0.3 MG/DL
BILIRUB SERPL-MCNC: 0.8 MG/DL (ref 0.2–1.1)
BUN SERPL-MCNC: 18 MG/DL (ref 8–23)
CALCIUM SERPL-MCNC: 9.4 MG/DL (ref 8.3–10.4)
CHLORIDE SERPL-SCNC: 104 MMOL/L (ref 103–113)
CHOLEST SERPL-MCNC: 185 MG/DL
CO2 SERPL-SCNC: 31 MMOL/L (ref 21–32)
CREAT SERPL-MCNC: 1 MG/DL (ref 0.8–1.5)
DIFFERENTIAL METHOD BLD: NORMAL
EOSINOPHIL # BLD: 0.1 K/UL (ref 0–0.8)
EOSINOPHIL NFR BLD: 2 % (ref 0.5–7.8)
ERYTHROCYTE [DISTWIDTH] IN BLOOD BY AUTOMATED COUNT: 12.7 % (ref 11.9–14.6)
GLOBULIN SER CALC-MCNC: 2.6 G/DL (ref 2.8–4.5)
GLUCOSE SERPL-MCNC: 95 MG/DL (ref 65–100)
HCT VFR BLD AUTO: 41.3 % (ref 41.1–50.3)
HDLC SERPL-MCNC: 75 MG/DL (ref 40–60)
HDLC SERPL: 2.5
HGB BLD-MCNC: 13.8 G/DL (ref 13.6–17.2)
IMM GRANULOCYTES # BLD AUTO: 0 K/UL (ref 0–0.5)
IMM GRANULOCYTES NFR BLD AUTO: 0 % (ref 0–5)
LDLC SERPL CALC-MCNC: 74.6 MG/DL
LYMPHOCYTES # BLD: 1.1 K/UL (ref 0.5–4.6)
LYMPHOCYTES NFR BLD: 17 % (ref 13–44)
MCH RBC QN AUTO: 31.1 PG (ref 26.1–32.9)
MCHC RBC AUTO-ENTMCNC: 33.4 G/DL (ref 31.4–35)
MCV RBC AUTO: 93 FL (ref 82–102)
MONOCYTES # BLD: 0.5 K/UL (ref 0.1–1.3)
MONOCYTES NFR BLD: 8 % (ref 4–12)
NEUTS SEG # BLD: 4.7 K/UL (ref 1.7–8.2)
NEUTS SEG NFR BLD: 72 % (ref 43–78)
NRBC # BLD: 0 K/UL (ref 0–0.2)
PLATELET # BLD AUTO: 183 K/UL (ref 150–450)
PMV BLD AUTO: 9.8 FL (ref 9.4–12.3)
POTASSIUM SERPL-SCNC: 3.6 MMOL/L (ref 3.5–5.1)
PROT SERPL-MCNC: 6.4 G/DL (ref 6.3–8.2)
RBC # BLD AUTO: 4.44 M/UL (ref 4.23–5.6)
SODIUM SERPL-SCNC: 139 MMOL/L (ref 136–146)
TRIGL SERPL-MCNC: 177 MG/DL (ref 35–150)
TSH, 3RD GENERATION: 1.41 UIU/ML (ref 0.36–3.74)
VLDLC SERPL CALC-MCNC: 35.4 MG/DL (ref 6–23)
WBC # BLD AUTO: 6.5 K/UL (ref 4.3–11.1)

## 2024-01-08 PROCEDURE — 3077F SYST BP >= 140 MM HG: CPT | Performed by: INTERNAL MEDICINE

## 2024-01-08 PROCEDURE — 99214 OFFICE O/P EST MOD 30 MIN: CPT | Performed by: INTERNAL MEDICINE

## 2024-01-08 PROCEDURE — G8484 FLU IMMUNIZE NO ADMIN: HCPCS | Performed by: INTERNAL MEDICINE

## 2024-01-08 PROCEDURE — G8427 DOCREV CUR MEDS BY ELIG CLIN: HCPCS | Performed by: INTERNAL MEDICINE

## 2024-01-08 PROCEDURE — 1036F TOBACCO NON-USER: CPT | Performed by: INTERNAL MEDICINE

## 2024-01-08 PROCEDURE — G8419 CALC BMI OUT NRM PARAM NOF/U: HCPCS | Performed by: INTERNAL MEDICINE

## 2024-01-08 PROCEDURE — 1123F ACP DISCUSS/DSCN MKR DOCD: CPT | Performed by: INTERNAL MEDICINE

## 2024-01-08 PROCEDURE — 3079F DIAST BP 80-89 MM HG: CPT | Performed by: INTERNAL MEDICINE

## 2024-01-08 RX ORDER — LOSARTAN POTASSIUM AND HYDROCHLOROTHIAZIDE 12.5; 1 MG/1; MG/1
1 TABLET ORAL
Qty: 90 TABLET | Refills: 1 | Status: SHIPPED | OUTPATIENT
Start: 2024-01-08

## 2024-01-08 RX ORDER — ATORVASTATIN CALCIUM 20 MG/1
TABLET, FILM COATED ORAL
Qty: 90 TABLET | Refills: 1 | Status: SHIPPED | OUTPATIENT
Start: 2024-01-08

## 2024-01-08 ASSESSMENT — ANXIETY QUESTIONNAIRES
2. NOT BEING ABLE TO STOP OR CONTROL WORRYING: 1
3. WORRYING TOO MUCH ABOUT DIFFERENT THINGS: 0
7. FEELING AFRAID AS IF SOMETHING AWFUL MIGHT HAPPEN: 0
1. FEELING NERVOUS, ANXIOUS, OR ON EDGE: 1
GAD7 TOTAL SCORE: 4
4. TROUBLE RELAXING: 1
IF YOU CHECKED OFF ANY PROBLEMS ON THIS QUESTIONNAIRE, HOW DIFFICULT HAVE THESE PROBLEMS MADE IT FOR YOU TO DO YOUR WORK, TAKE CARE OF THINGS AT HOME, OR GET ALONG WITH OTHER PEOPLE: NOT DIFFICULT AT ALL
5. BEING SO RESTLESS THAT IT IS HARD TO SIT STILL: 1
6. BECOMING EASILY ANNOYED OR IRRITABLE: 0

## 2024-01-08 ASSESSMENT — PATIENT HEALTH QUESTIONNAIRE - PHQ9
SUM OF ALL RESPONSES TO PHQ9 QUESTIONS 1 & 2: 0
SUM OF ALL RESPONSES TO PHQ QUESTIONS 1-9: 0
1. LITTLE INTEREST OR PLEASURE IN DOING THINGS: 0
2. FEELING DOWN, DEPRESSED OR HOPELESS: 0

## 2024-01-08 ASSESSMENT — ENCOUNTER SYMPTOMS: SHORTNESS OF BREATH: 0

## 2024-01-08 NOTE — PROGRESS NOTES
[facility]  Jake Werner M.D.  Internal Medicine  [address] [place] [id number]  Office : [phone]  Fax : [phone]    Chief Complaint   Patient presents with    Hypertension     4 mo follow up       History of Present Illness:  Mike Bauman is a 78 y.o. male.  HPI    Hypertension  Patient is in for Hypertension which is stable.    Diet and Lifestyle: generally follows a low sodium diet  exercises sporadically  nonsmoker  Home BP Monitoring: is well controlled at home, ranging 130's/70-80's, Reviewed blood pressure diary with patient at todays visit. .Patient's recent blood pressures were   BP Readings from Last 3 Encounters:   01/08/24 (!) 160/80   09/27/23 (!) 163/82   09/06/23 133/74   .   taking medications as instructed, no medication side effects noted, no TIA's, no chest pain on exertion, no dyspnea on exertion, no swelling of ankles. Cardiac risk factors consist of advanced age (older than 55 for men, 65 for women), dyslipidemia, hypertension, and male gender.  Lab Results   Component Value Date/Time     01/22/2023 05:52 PM    K 4.7 01/22/2023 05:52 PM     01/22/2023 05:52 PM    CO2 29 01/22/2023 05:52 PM    BUN 18 01/22/2023 05:52 PM    CREATININE 0.83 01/22/2023 05:52 PM    GLUCOSE 136 01/22/2023 05:52 PM    CALCIUM 10.0 01/22/2023 05:52 PM    LABGLOM >60 01/22/2023 05:52 PM        Hyperlipidemia  Patient is in for follow-up for hyperlipidemia.  Diet and Lifestyle: generally follows a low fat low cholesterol diet. Risk factors for vascular disease consist of hyperlipidemia and hypertension. Last LDL was   Lab Results   Component Value Date    LDLCALC 71 09/14/2022   . Last ALT was   Lab Results   Component Value Date/Time    ALT 25 01/22/2023 05:52 PM   .  No muscle aches.    Prostate cancer  Has recurred after 5 years from original diagnosis in lymph nodes.  Current treatment is Eligard, abiraterone and prednisone.  Finished

## 2024-01-17 PROCEDURE — 93296 REM INTERROG EVL PM/IDS: CPT | Performed by: INTERNAL MEDICINE

## 2024-01-17 PROCEDURE — 93294 REM INTERROG EVL PM/LDLS PM: CPT | Performed by: INTERNAL MEDICINE

## 2024-01-22 NOTE — TELEPHONE ENCOUNTER
From: Mike Bauman  Sent: 1/20/2024 12:16 PM EST  To: [facility] Clinical Staff  Subject: RSV and COVID inoculation's    This is the info on our shots that I received from [facility].  flu vacine, 10/27/2023, Fluzone High Dose 2023-24 INJ SA  RSV vacine, 12/23/2023, Arexvy 120 MCG, INJ GLA  Covid vacine, 12/23/2023, COMIRNATY 12+GFS 23-24 INJ PF  TDAP vacine, 1/19/24, BOOSTRIX JOSH GLA

## 2024-03-28 ENCOUNTER — OFFICE VISIT (OUTPATIENT)
Dept: VASCULAR SURGERY | Age: 79
End: 2024-03-28
Payer: MEDICARE

## 2024-03-28 VITALS
WEIGHT: 173 LBS | HEART RATE: 60 BPM | HEIGHT: 70 IN | SYSTOLIC BLOOD PRESSURE: 148 MMHG | OXYGEN SATURATION: 97 % | BODY MASS INDEX: 24.77 KG/M2 | DIASTOLIC BLOOD PRESSURE: 84 MMHG

## 2024-03-28 DIAGNOSIS — I65.22 STENOSIS OF LEFT CAROTID ARTERY: Primary | ICD-10-CM

## 2024-03-28 DIAGNOSIS — Z98.890 S/P CAROTID ENDARTERECTOMY: ICD-10-CM

## 2024-03-28 PROCEDURE — 1036F TOBACCO NON-USER: CPT | Performed by: NURSE PRACTITIONER

## 2024-03-28 PROCEDURE — G8420 CALC BMI NORM PARAMETERS: HCPCS | Performed by: NURSE PRACTITIONER

## 2024-03-28 PROCEDURE — 3075F SYST BP GE 130 - 139MM HG: CPT | Performed by: NURSE PRACTITIONER

## 2024-03-28 PROCEDURE — 3079F DIAST BP 80-89 MM HG: CPT | Performed by: NURSE PRACTITIONER

## 2024-03-28 PROCEDURE — G8427 DOCREV CUR MEDS BY ELIG CLIN: HCPCS | Performed by: NURSE PRACTITIONER

## 2024-03-28 PROCEDURE — 1123F ACP DISCUSS/DSCN MKR DOCD: CPT | Performed by: NURSE PRACTITIONER

## 2024-03-28 PROCEDURE — 99213 OFFICE O/P EST LOW 20 MIN: CPT | Performed by: NURSE PRACTITIONER

## 2024-03-28 PROCEDURE — G8484 FLU IMMUNIZE NO ADMIN: HCPCS | Performed by: NURSE PRACTITIONER

## 2024-03-28 NOTE — PROGRESS NOTES
DATE OF VISIT: 3/28/2024      HPI  Mike Bauman is a 78 y.o. male follows up for his 6-month carotid duplex study.  He denies any new lateralizing neurological symptoms.  He remains on Xarelto and atorvastatin.  He has undergone a right carotid endarterectomy.         MEDICAL HISTORY:   Past Medical History:   Diagnosis Date    Aortic valve sclerosis     Bradycardia     Bundle branch block          Cancer (HCC)     prostate and lymph nodes    Diverticulitis     High cholesterol     History of basal cell cancer     forehead    History of colonic polyps     History of prostate cancer 2013    External Beam and brachytherapy    Hypertension     Mild aortic stenosis     PVC (premature ventricular contraction)     Varicella          SURGICAL HISTORY:   Past Surgical History:   Procedure Laterality Date    CAROTID ENDARTERECTOMY Right 12/15/14    COLONOSCOPY  11/2012    LYMPH NODE DISSECTION      PACEMAKER  04/28/2019       Review of Systems:  Constitutional:   Negative for fevers and unexplained weight loss.  Respiratory:   Negative for hemoptysis.  Cardiovascular:   Negative except as noted in HPI.  Musculoskeletal:  Negative for active, unexplained/severe joint pain.      ALLERGIES: No Known Allergies    CURRENT MEDICATIONS:  Current Outpatient Medications   Medication Sig Dispense Refill    atorvastatin (LIPITOR) 20 MG tablet Take 1 tablet by mouth nightly 90 tablet 1    losartan-hydroCHLOROthiazide (HYZAAR) 100-12.5 MG per tablet Take 1 tablet by mouth Daily with supper 90 tablet 1    XARELTO 20 MG TABS tablet TAKE 1 TABLET BY MOUTH ONCE DAILY WITH SUPPER 90 tablet 3    Multiple Vitamins-Minerals (PRESERVISION AREDS 2 PO) Take by mouth      vitamin D (CHOLECALCIFEROL) 125 MCG (5000 UT) CAPS capsule Take 1 capsule by mouth daily      abiraterone acetate (ZYTIGA) 250 MG tablet Indications: 3 tablets      predniSONE (DELTASONE) 5 MG tablet TAKE 1 TABLET BY MOUTH IN THE MORNING FOR 10 DAYS       No current

## 2024-04-04 ENCOUNTER — OFFICE VISIT (OUTPATIENT)
Dept: INTERNAL MEDICINE CLINIC | Facility: CLINIC | Age: 79
End: 2024-04-04
Payer: MEDICARE

## 2024-04-04 VITALS
OXYGEN SATURATION: 99 % | BODY MASS INDEX: 24.34 KG/M2 | SYSTOLIC BLOOD PRESSURE: 164 MMHG | DIASTOLIC BLOOD PRESSURE: 82 MMHG | WEIGHT: 170 LBS | TEMPERATURE: 97.2 F | HEIGHT: 70 IN | HEART RATE: 60 BPM

## 2024-04-04 DIAGNOSIS — J20.9 ACUTE BRONCHITIS, UNSPECIFIED ORGANISM: Primary | ICD-10-CM

## 2024-04-04 PROCEDURE — 99213 OFFICE O/P EST LOW 20 MIN: CPT | Performed by: INTERNAL MEDICINE

## 2024-04-04 PROCEDURE — G8427 DOCREV CUR MEDS BY ELIG CLIN: HCPCS | Performed by: INTERNAL MEDICINE

## 2024-04-04 PROCEDURE — 3077F SYST BP >= 140 MM HG: CPT | Performed by: INTERNAL MEDICINE

## 2024-04-04 PROCEDURE — G8420 CALC BMI NORM PARAMETERS: HCPCS | Performed by: INTERNAL MEDICINE

## 2024-04-04 PROCEDURE — 3079F DIAST BP 80-89 MM HG: CPT | Performed by: INTERNAL MEDICINE

## 2024-04-04 PROCEDURE — 1123F ACP DISCUSS/DSCN MKR DOCD: CPT | Performed by: INTERNAL MEDICINE

## 2024-04-04 PROCEDURE — 1036F TOBACCO NON-USER: CPT | Performed by: INTERNAL MEDICINE

## 2024-04-04 RX ORDER — AZITHROMYCIN 250 MG/1
TABLET, FILM COATED ORAL
Qty: 6 TABLET | Refills: 0 | Status: SHIPPED | OUTPATIENT
Start: 2024-04-04 | End: 2024-04-14

## 2024-04-04 ASSESSMENT — ENCOUNTER SYMPTOMS
WHEEZING: 0
SORE THROAT: 0
COUGH: 1

## 2024-04-04 NOTE — PROGRESS NOTES
[facility]  Jake Werner M.D.  Internal Medicine  [address] [place] [address]  Office : [phone]  Fax : [phone]    Chief Complaint   Patient presents with    Cough     Cough and congestion x 4 days home COVID test was negative monday       History of Present Illness:  Mike Bauman is a 78 y.o. male.  Cough  This is a new problem. The current episode started in the past 7 days. The problem has been gradually improving. The cough is Productive of purulent sputum. Associated symptoms include headaches and rhinorrhea. Pertinent negatives include no chills, ear pain, fever, postnasal drip, rash, sore throat, shortness of breath, sweats or wheezing. He has tried OTC cough suppressant and prescription cough suppressant for the symptoms. His past medical history is significant for environmental allergies. There is no history of asthma or emphysema.           Past Medical History:  Past Medical History:   Diagnosis Date    Aortic valve sclerosis     Bradycardia     Bundle branch block          Cancer (HCC)     prostate and lymph nodes    Diverticulitis     High cholesterol     History of basal cell cancer     forehead    History of colonic polyps     History of prostate cancer 2013    External Beam and brachytherapy    Hypertension     Mild aortic stenosis     PVC (premature ventricular contraction)     Varicella      Past Surgical History:  Past Surgical History:   Procedure Laterality Date    CAROTID ENDARTERECTOMY Right 12/15/14    COLONOSCOPY  11/2012    LYMPH NODE DISSECTION      PACEMAKER  04/28/2019     Allergies:   No Known Allergies  Medications:   Current Outpatient Medications   Medication Sig Dispense Refill    azithromycin (ZITHROMAX) 250 MG tablet 500mg on day 1 followed by 250mg on days 2 - 5 6 tablet 0    atorvastatin (LIPITOR) 20 MG tablet Take 1 tablet by mouth nightly 90 tablet 1    losartan-hydroCHLOROthiazide (HYZAAR) 100-12.5 MG per

## 2024-05-04 ENCOUNTER — APPOINTMENT (OUTPATIENT)
Dept: GENERAL RADIOLOGY | Age: 79
End: 2024-05-04
Payer: MEDICARE

## 2024-05-04 ENCOUNTER — HOSPITAL ENCOUNTER (EMERGENCY)
Age: 79
Discharge: HOME | End: 2024-05-04
Attending: EMERGENCY MEDICINE
Payer: MEDICARE

## 2024-05-04 VITALS
WEIGHT: 168 LBS | OXYGEN SATURATION: 97 % | HEART RATE: 59 BPM | DIASTOLIC BLOOD PRESSURE: 74 MMHG | SYSTOLIC BLOOD PRESSURE: 136 MMHG | TEMPERATURE: 98.6 F | BODY MASS INDEX: 24.05 KG/M2 | HEIGHT: 70 IN | RESPIRATION RATE: 18 BRPM

## 2024-05-04 DIAGNOSIS — R59.0 CERVICAL LYMPHADENOPATHY: ICD-10-CM

## 2024-05-04 DIAGNOSIS — J02.9 ACUTE PHARYNGITIS, UNSPECIFIED ETIOLOGY: Primary | ICD-10-CM

## 2024-05-04 LAB — STREP, MOLECULAR: NOT DETECTED

## 2024-05-04 PROCEDURE — 99284 EMERGENCY DEPT VISIT MOD MDM: CPT

## 2024-05-04 PROCEDURE — 87081 CULTURE SCREEN ONLY: CPT

## 2024-05-04 PROCEDURE — 87651 STREP A DNA AMP PROBE: CPT

## 2024-05-04 PROCEDURE — 70360 X-RAY EXAM OF NECK: CPT

## 2024-05-04 ASSESSMENT — PAIN DESCRIPTION - LOCATION: LOCATION: THROAT;NECK

## 2024-05-04 ASSESSMENT — PAIN DESCRIPTION - DESCRIPTORS: DESCRIPTORS: ACHING

## 2024-05-04 ASSESSMENT — ENCOUNTER SYMPTOMS
COUGH: 0
RHINORRHEA: 0
SORE THROAT: 1
TROUBLE SWALLOWING: 0

## 2024-05-04 ASSESSMENT — PAIN - FUNCTIONAL ASSESSMENT: PAIN_FUNCTIONAL_ASSESSMENT: 0-10

## 2024-05-04 ASSESSMENT — PAIN SCALES - GENERAL: PAINLEVEL_OUTOF10: 6

## 2024-05-04 ASSESSMENT — LIFESTYLE VARIABLES
HOW MANY STANDARD DRINKS CONTAINING ALCOHOL DO YOU HAVE ON A TYPICAL DAY: 1 OR 2
HOW OFTEN DO YOU HAVE A DRINK CONTAINING ALCOHOL: MONTHLY OR LESS

## 2024-05-04 ASSESSMENT — PAIN DESCRIPTION - PAIN TYPE: TYPE: ACUTE PAIN

## 2024-05-04 ASSESSMENT — PAIN DESCRIPTION - ORIENTATION: ORIENTATION: LEFT

## 2024-05-04 NOTE — ED TRIAGE NOTES
C/o sore throat and the left side of his neck swelling for several days.  Denies any fever or problems swallowing

## 2024-05-04 NOTE — DISCHARGE INSTRUCTIONS
Tylenol for pain.  Salt water gargles for sore throat every 1-2 hours as needed.  1 to 2 teaspoons of salt and 1 to 2 teaspoons of baking soda into a 16 ounce bottle of water.  Shake it up gargle every hour or 2 as needed.  Increase fluids.  Follow-up with your doctor in 1 week if not improving.  We will contact you if the throat culture grows out strep in need of treatment with antibiotics

## 2024-05-04 NOTE — ED PROVIDER NOTES
Mouth: Mucous membranes are moist.      Pharynx: Posterior oropharyngeal erythema present. No oropharyngeal exudate.   Eyes:      Conjunctiva/sclera: Conjunctivae normal.      Pupils: Pupils are equal, round, and reactive to light.   Cardiovascular:      Rate and Rhythm: Normal rate and regular rhythm.      Pulses: Normal pulses.      Heart sounds: Normal heart sounds.   Pulmonary:      Effort: Pulmonary effort is normal.      Breath sounds: Normal breath sounds.   Abdominal:      General: There is no distension.      Palpations: Abdomen is soft.      Tenderness: There is no abdominal tenderness. There is no guarding or rebound.   Musculoskeletal:         General: Normal range of motion.      Cervical back: No tenderness.   Lymphadenopathy:      Cervical: Cervical adenopathy (Bilateral cervical lymphadenopathy, circumferential tracheal calcification palpable) present.   Skin:     General: Skin is warm and dry.   Neurological:      Mental Status: He is alert and oriented to person, place, and time.   Psychiatric:         Behavior: Behavior normal.        Procedures     Procedures    Orders Placed This Encounter   Procedures    Group A Strep Screen By PCR    Strep A culture, throat    XR NECK SOFT TISSUE        Medications given during this emergency department visit:  Medications - No data to display    New Prescriptions    No medications on file        Past Medical History:   Diagnosis Date    Aortic valve sclerosis     Bradycardia     Bundle branch block          Cancer (HCC)     prostate and lymph nodes    Diverticulitis     High cholesterol     History of basal cell cancer     forehead    History of colonic polyps     History of prostate cancer 2013    External Beam and brachytherapy    Hypertension     Mild aortic stenosis     PVC (premature ventricular contraction)     Varicella         Past Surgical History:   Procedure Laterality Date    CAROTID ENDARTERECTOMY Right 12/15/14    COLONOSCOPY  11/2012    LYMPH

## 2024-05-05 LAB
BACTERIA SPEC CULT: NORMAL
SERVICE CMNT-IMP: NORMAL

## 2024-05-06 LAB
BACTERIA SPEC CULT: NORMAL
SERVICE CMNT-IMP: NORMAL

## 2024-05-08 ENCOUNTER — OFFICE VISIT (OUTPATIENT)
Dept: INTERNAL MEDICINE CLINIC | Facility: CLINIC | Age: 79
End: 2024-05-08
Payer: MEDICARE

## 2024-05-08 VITALS
SYSTOLIC BLOOD PRESSURE: 146 MMHG | WEIGHT: 173 LBS | BODY MASS INDEX: 24.77 KG/M2 | TEMPERATURE: 97.3 F | HEIGHT: 70 IN | DIASTOLIC BLOOD PRESSURE: 71 MMHG | HEART RATE: 60 BPM | OXYGEN SATURATION: 98 %

## 2024-05-08 DIAGNOSIS — C61 PROSTATE CANCER (HCC): ICD-10-CM

## 2024-05-08 DIAGNOSIS — I10 ESSENTIAL HYPERTENSION: ICD-10-CM

## 2024-05-08 DIAGNOSIS — I35.0 MILD AORTIC STENOSIS: ICD-10-CM

## 2024-05-08 DIAGNOSIS — E78.49 OTHER HYPERLIPIDEMIA: Primary | ICD-10-CM

## 2024-05-08 PROCEDURE — 1123F ACP DISCUSS/DSCN MKR DOCD: CPT | Performed by: INTERNAL MEDICINE

## 2024-05-08 PROCEDURE — G2211 COMPLEX E/M VISIT ADD ON: HCPCS | Performed by: INTERNAL MEDICINE

## 2024-05-08 PROCEDURE — 99214 OFFICE O/P EST MOD 30 MIN: CPT | Performed by: INTERNAL MEDICINE

## 2024-05-08 PROCEDURE — 3078F DIAST BP <80 MM HG: CPT | Performed by: INTERNAL MEDICINE

## 2024-05-08 PROCEDURE — 1036F TOBACCO NON-USER: CPT | Performed by: INTERNAL MEDICINE

## 2024-05-08 PROCEDURE — 3077F SYST BP >= 140 MM HG: CPT | Performed by: INTERNAL MEDICINE

## 2024-05-08 PROCEDURE — G8420 CALC BMI NORM PARAMETERS: HCPCS | Performed by: INTERNAL MEDICINE

## 2024-05-08 PROCEDURE — G8427 DOCREV CUR MEDS BY ELIG CLIN: HCPCS | Performed by: INTERNAL MEDICINE

## 2024-05-08 RX ORDER — LOSARTAN POTASSIUM AND HYDROCHLOROTHIAZIDE 12.5; 1 MG/1; MG/1
1 TABLET ORAL
Qty: 90 TABLET | Refills: 1 | Status: SHIPPED | OUTPATIENT
Start: 2024-05-08

## 2024-05-08 RX ORDER — ATORVASTATIN CALCIUM 20 MG/1
TABLET, FILM COATED ORAL
Qty: 90 TABLET | Refills: 1 | Status: SHIPPED | OUTPATIENT
Start: 2024-05-08

## 2024-05-08 SDOH — ECONOMIC STABILITY: FOOD INSECURITY: WITHIN THE PAST 12 MONTHS, YOU WORRIED THAT YOUR FOOD WOULD RUN OUT BEFORE YOU GOT MONEY TO BUY MORE.: NEVER TRUE

## 2024-05-08 SDOH — ECONOMIC STABILITY: FOOD INSECURITY: WITHIN THE PAST 12 MONTHS, THE FOOD YOU BOUGHT JUST DIDN'T LAST AND YOU DIDN'T HAVE MONEY TO GET MORE.: NEVER TRUE

## 2024-05-08 SDOH — ECONOMIC STABILITY: INCOME INSECURITY: HOW HARD IS IT FOR YOU TO PAY FOR THE VERY BASICS LIKE FOOD, HOUSING, MEDICAL CARE, AND HEATING?: NOT HARD AT ALL

## 2024-05-08 ASSESSMENT — PATIENT HEALTH QUESTIONNAIRE - PHQ9
SUM OF ALL RESPONSES TO PHQ9 QUESTIONS 1 & 2: 0
1. LITTLE INTEREST OR PLEASURE IN DOING THINGS: NOT AT ALL
SUM OF ALL RESPONSES TO PHQ QUESTIONS 1-9: 0
SUM OF ALL RESPONSES TO PHQ QUESTIONS 1-9: 0
2. FEELING DOWN, DEPRESSED OR HOPELESS: NOT AT ALL
SUM OF ALL RESPONSES TO PHQ QUESTIONS 1-9: 0
SUM OF ALL RESPONSES TO PHQ QUESTIONS 1-9: 0

## 2024-05-08 ASSESSMENT — ANXIETY QUESTIONNAIRES
3. WORRYING TOO MUCH ABOUT DIFFERENT THINGS: NOT AT ALL
GAD7 TOTAL SCORE: 0
2. NOT BEING ABLE TO STOP OR CONTROL WORRYING: NOT AT ALL
1. FEELING NERVOUS, ANXIOUS, OR ON EDGE: NOT AT ALL
6. BECOMING EASILY ANNOYED OR IRRITABLE: NOT AT ALL
IF YOU CHECKED OFF ANY PROBLEMS ON THIS QUESTIONNAIRE, HOW DIFFICULT HAVE THESE PROBLEMS MADE IT FOR YOU TO DO YOUR WORK, TAKE CARE OF THINGS AT HOME, OR GET ALONG WITH OTHER PEOPLE: NOT DIFFICULT AT ALL
4. TROUBLE RELAXING: NOT AT ALL
5. BEING SO RESTLESS THAT IT IS HARD TO SIT STILL: NOT AT ALL
7. FEELING AFRAID AS IF SOMETHING AWFUL MIGHT HAPPEN: NOT AT ALL

## 2024-05-08 ASSESSMENT — ENCOUNTER SYMPTOMS
BACK PAIN: 1
SHORTNESS OF BREATH: 0

## 2024-05-08 NOTE — PROGRESS NOTES
Alcohol/week: 4.0 - 5.0 standard drinks of alcohol     Family History  Family History   Problem Relation Age of Onset    Cancer Mother         colon    Heart Disease Mother     Cancer Father         lung    Heart Disease Sister        Review of Systems   Constitutional:  Negative for chills, fatigue and fever.   Respiratory:  Negative for shortness of breath.    Cardiovascular:  Negative for chest pain.   Musculoskeletal:  Positive for back pain.   Skin:  Negative for rash.   Neurological:  Negative for speech difficulty.   Psychiatric/Behavioral:  Negative for dysphoric mood.          Vital Signs  BP (!) 146/71 (Site: Left Upper Arm, Position: Sitting, Cuff Size: Large Adult)   Pulse 60   Temp 97.3 °F (36.3 °C) (Temporal)   Ht 1.778 m (5' 10\")   Wt 78.5 kg (173 lb)   SpO2 98%   BMI 24.82 kg/m²   Body mass index is 24.82 kg/m².    Physical Exam  Vitals reviewed.   Constitutional:       General: He is not in acute distress.     Appearance: Normal appearance. He is not ill-appearing.   HENT:      Head: Normocephalic and atraumatic.   Eyes:      General: No scleral icterus.     Conjunctiva/sclera: Conjunctivae normal.   Neck:      Vascular: No carotid bruit.   Cardiovascular:      Rate and Rhythm: Normal rate and regular rhythm.      Heart sounds: Normal heart sounds. No murmur heard.  Pulmonary:      Effort: Pulmonary effort is normal.      Breath sounds: Normal breath sounds.   Musculoskeletal:         General: No swelling.   Lymphadenopathy:      Cervical: No cervical adenopathy.   Skin:     Coloration: Skin is not jaundiced.      Findings: No rash.   Neurological:      General: No focal deficit present.      Mental Status: He is alert. Mental status is at baseline.      Cranial Nerves: No cranial nerve deficit.      Motor: No weakness.      Gait: Gait normal.   Psychiatric:         Mood and Affect: Mood normal.         Behavior: Behavior normal.         Thought Content: Thought content normal.

## 2024-06-14 ENCOUNTER — TELEPHONE (OUTPATIENT)
Age: 79
End: 2024-06-14

## 2024-06-14 NOTE — TELEPHONE ENCOUNTER
LVM for pt to call back ~ called to see if pt would be interested in seeing Purnima, pt will need their device check moved to the same day as appt with Purnima

## 2024-06-18 DIAGNOSIS — I10 ESSENTIAL HYPERTENSION: ICD-10-CM

## 2024-06-18 RX ORDER — AMLODIPINE BESYLATE 5 MG/1
TABLET ORAL
Qty: 90 TABLET | Refills: 0 | OUTPATIENT
Start: 2024-06-18

## 2024-07-05 ENCOUNTER — OFFICE VISIT (OUTPATIENT)
Age: 79
End: 2024-07-05
Payer: MEDICARE

## 2024-07-05 ENCOUNTER — NURSE ONLY (OUTPATIENT)
Age: 79
End: 2024-07-05

## 2024-07-05 VITALS
WEIGHT: 172.1 LBS | SYSTOLIC BLOOD PRESSURE: 126 MMHG | DIASTOLIC BLOOD PRESSURE: 76 MMHG | HEART RATE: 60 BPM | HEIGHT: 70 IN | BODY MASS INDEX: 24.64 KG/M2

## 2024-07-05 DIAGNOSIS — I44.2 CHB (COMPLETE HEART BLOCK) (HCC): Primary | ICD-10-CM

## 2024-07-05 DIAGNOSIS — I35.0 NONRHEUMATIC AORTIC VALVE STENOSIS: Primary | ICD-10-CM

## 2024-07-05 DIAGNOSIS — Z95.0 PACEMAKER: ICD-10-CM

## 2024-07-05 DIAGNOSIS — I48.19 PERSISTENT ATRIAL FIBRILLATION (HCC): ICD-10-CM

## 2024-07-05 DIAGNOSIS — I48.92 ATRIAL FLUTTER, UNSPECIFIED TYPE (HCC): ICD-10-CM

## 2024-07-05 DIAGNOSIS — I10 PRIMARY HYPERTENSION: ICD-10-CM

## 2024-07-05 PROCEDURE — 3074F SYST BP LT 130 MM HG: CPT | Performed by: NURSE PRACTITIONER

## 2024-07-05 PROCEDURE — 1036F TOBACCO NON-USER: CPT | Performed by: NURSE PRACTITIONER

## 2024-07-05 PROCEDURE — 3078F DIAST BP <80 MM HG: CPT | Performed by: NURSE PRACTITIONER

## 2024-07-05 PROCEDURE — G8428 CUR MEDS NOT DOCUMENT: HCPCS | Performed by: NURSE PRACTITIONER

## 2024-07-05 PROCEDURE — 1123F ACP DISCUSS/DSCN MKR DOCD: CPT | Performed by: NURSE PRACTITIONER

## 2024-07-05 PROCEDURE — 99214 OFFICE O/P EST MOD 30 MIN: CPT | Performed by: NURSE PRACTITIONER

## 2024-07-05 PROCEDURE — G8420 CALC BMI NORM PARAMETERS: HCPCS | Performed by: NURSE PRACTITIONER

## 2024-07-05 RX ORDER — AMLODIPINE BESYLATE 5 MG/1
1 TABLET ORAL
COMMUNITY

## 2024-07-05 NOTE — PROGRESS NOTES
Future    Atrial flutter, unspecified type (HCC)    Primary hypertension    Persistent atrial fibrillation (HCC)    Pacemaker          ASSESSMENT and PLAN  1. PPM:   - functioning normally, cont device checks     2. CHB:   - stable post PPM, high V pacing, echo 7/2023 showed normal EF, will repeat echo      3. Atrial flutter, new diagnosis:   -s/p ablation, now with persistent AF     4. pAF:   - 100% afib burden      5. HTN:   - controlled, cont losartan 100mg, amlodipine 5mg     6. Mild AS:   - repeat echo     7. CVA protection:   - cont xarelto 20mg, no bleeding    Patient has been instructed and agrees to call our office with any issues or other concerns related to their cardiac condition(s) and/or complaint(s).    Return in about 1 year (around 7/5/2025).       Purnima Costa, ARON - CNP    7/5/2024  11:30 AM

## 2024-07-15 RX ORDER — AMLODIPINE BESYLATE 5 MG/1
TABLET ORAL
Qty: 90 TABLET | Refills: 0 | OUTPATIENT
Start: 2024-07-15

## 2024-07-17 ENCOUNTER — PATIENT MESSAGE (OUTPATIENT)
Dept: INTERNAL MEDICINE CLINIC | Facility: CLINIC | Age: 79
End: 2024-07-17

## 2024-07-17 DIAGNOSIS — I10 ESSENTIAL HYPERTENSION: Primary | ICD-10-CM

## 2024-07-17 RX ORDER — AMLODIPINE BESYLATE 5 MG/1
5 TABLET ORAL DAILY
Qty: 30 TABLET | Status: CANCELLED | OUTPATIENT
Start: 2024-07-17

## 2024-07-17 NOTE — TELEPHONE ENCOUNTER
----- Message from Mike Bauman sent at 7/17/2024  2:58 PM EDT -----  Regarding: amlodipine rx  Contact: [phone]  please approve the refill request from my pharmacy for my amlopipine prescription , I am running out and going out of town for a week. Thanks

## 2024-07-19 RX ORDER — AMLODIPINE BESYLATE 5 MG/1
5 TABLET ORAL DAILY
Qty: 90 TABLET | Refills: 1 | Status: SHIPPED | OUTPATIENT
Start: 2024-07-19

## 2024-07-19 NOTE — TELEPHONE ENCOUNTER
Key Barrientos 7/19/2024 8:50 AM EDT      ----- Message -----  From: Mike Bauman  Sent: 7/18/2024 5:22 PM EDT  To: [facility] Clinical Staff  Subject: Amlodipine     News to me. my latest refill was on 3/23/24 where you authorized a 90 day supply of 5mg tablets.

## 2024-07-27 PROCEDURE — 93294 REM INTERROG EVL PM/LDLS PM: CPT | Performed by: INTERNAL MEDICINE

## 2024-07-27 PROCEDURE — 93296 REM INTERROG EVL PM/IDS: CPT | Performed by: INTERNAL MEDICINE

## 2024-09-09 ENCOUNTER — OFFICE VISIT (OUTPATIENT)
Dept: INTERNAL MEDICINE CLINIC | Facility: CLINIC | Age: 79
End: 2024-09-09
Payer: MEDICARE

## 2024-09-09 VITALS
TEMPERATURE: 97.3 F | WEIGHT: 173 LBS | HEIGHT: 70 IN | SYSTOLIC BLOOD PRESSURE: 127 MMHG | DIASTOLIC BLOOD PRESSURE: 71 MMHG | BODY MASS INDEX: 24.77 KG/M2 | OXYGEN SATURATION: 99 % | HEART RATE: 61 BPM

## 2024-09-09 DIAGNOSIS — I48.19 PERSISTENT ATRIAL FIBRILLATION (HCC): ICD-10-CM

## 2024-09-09 DIAGNOSIS — Z00.00 MEDICARE ANNUAL WELLNESS VISIT, SUBSEQUENT: ICD-10-CM

## 2024-09-09 DIAGNOSIS — C61 PROSTATE CANCER (HCC): ICD-10-CM

## 2024-09-09 DIAGNOSIS — I10 ESSENTIAL HYPERTENSION: Primary | ICD-10-CM

## 2024-09-09 DIAGNOSIS — Z79.01 CHRONIC ANTICOAGULATION: ICD-10-CM

## 2024-09-09 DIAGNOSIS — E78.49 OTHER HYPERLIPIDEMIA: ICD-10-CM

## 2024-09-09 PROCEDURE — 99214 OFFICE O/P EST MOD 30 MIN: CPT | Performed by: INTERNAL MEDICINE

## 2024-09-09 PROCEDURE — 3078F DIAST BP <80 MM HG: CPT | Performed by: INTERNAL MEDICINE

## 2024-09-09 PROCEDURE — 3074F SYST BP LT 130 MM HG: CPT | Performed by: INTERNAL MEDICINE

## 2024-09-09 PROCEDURE — G8420 CALC BMI NORM PARAMETERS: HCPCS | Performed by: INTERNAL MEDICINE

## 2024-09-09 PROCEDURE — 1123F ACP DISCUSS/DSCN MKR DOCD: CPT | Performed by: INTERNAL MEDICINE

## 2024-09-09 PROCEDURE — G0439 PPPS, SUBSEQ VISIT: HCPCS | Performed by: INTERNAL MEDICINE

## 2024-09-09 PROCEDURE — G8427 DOCREV CUR MEDS BY ELIG CLIN: HCPCS | Performed by: INTERNAL MEDICINE

## 2024-09-09 PROCEDURE — 1036F TOBACCO NON-USER: CPT | Performed by: INTERNAL MEDICINE

## 2024-09-09 RX ORDER — LOSARTAN POTASSIUM AND HYDROCHLOROTHIAZIDE 12.5; 1 MG/1; MG/1
1 TABLET ORAL
Qty: 90 TABLET | Refills: 1 | Status: SHIPPED | OUTPATIENT
Start: 2024-09-09

## 2024-09-09 RX ORDER — ATORVASTATIN CALCIUM 20 MG/1
TABLET, FILM COATED ORAL
Qty: 90 TABLET | Refills: 1 | Status: SHIPPED | OUTPATIENT
Start: 2024-09-09

## 2024-09-09 SDOH — ECONOMIC STABILITY: INCOME INSECURITY: HOW HARD IS IT FOR YOU TO PAY FOR THE VERY BASICS LIKE FOOD, HOUSING, MEDICAL CARE, AND HEATING?: NOT HARD AT ALL

## 2024-09-09 SDOH — ECONOMIC STABILITY: FOOD INSECURITY: WITHIN THE PAST 12 MONTHS, THE FOOD YOU BOUGHT JUST DIDN'T LAST AND YOU DIDN'T HAVE MONEY TO GET MORE.: NEVER TRUE

## 2024-09-09 SDOH — ECONOMIC STABILITY: FOOD INSECURITY: WITHIN THE PAST 12 MONTHS, YOU WORRIED THAT YOUR FOOD WOULD RUN OUT BEFORE YOU GOT MONEY TO BUY MORE.: NEVER TRUE

## 2024-09-09 ASSESSMENT — ANXIETY QUESTIONNAIRES
6. BECOMING EASILY ANNOYED OR IRRITABLE: NOT AT ALL
4. TROUBLE RELAXING: NOT AT ALL
2. NOT BEING ABLE TO STOP OR CONTROL WORRYING: NOT AT ALL
5. BEING SO RESTLESS THAT IT IS HARD TO SIT STILL: NOT AT ALL
3. WORRYING TOO MUCH ABOUT DIFFERENT THINGS: NOT AT ALL
1. FEELING NERVOUS, ANXIOUS, OR ON EDGE: NOT AT ALL
GAD7 TOTAL SCORE: 0
7. FEELING AFRAID AS IF SOMETHING AWFUL MIGHT HAPPEN: NOT AT ALL
IF YOU CHECKED OFF ANY PROBLEMS ON THIS QUESTIONNAIRE, HOW DIFFICULT HAVE THESE PROBLEMS MADE IT FOR YOU TO DO YOUR WORK, TAKE CARE OF THINGS AT HOME, OR GET ALONG WITH OTHER PEOPLE: NOT DIFFICULT AT ALL

## 2024-09-09 ASSESSMENT — PATIENT HEALTH QUESTIONNAIRE - PHQ9
SUM OF ALL RESPONSES TO PHQ QUESTIONS 1-9: 0
SUM OF ALL RESPONSES TO PHQ QUESTIONS 1-9: 0
1. LITTLE INTEREST OR PLEASURE IN DOING THINGS: NOT AT ALL
SUM OF ALL RESPONSES TO PHQ QUESTIONS 1-9: 0
SUM OF ALL RESPONSES TO PHQ QUESTIONS 1-9: 0
SUM OF ALL RESPONSES TO PHQ9 QUESTIONS 1 & 2: 0
2. FEELING DOWN, DEPRESSED OR HOPELESS: NOT AT ALL

## 2024-09-09 ASSESSMENT — LIFESTYLE VARIABLES
HOW MANY STANDARD DRINKS CONTAINING ALCOHOL DO YOU HAVE ON A TYPICAL DAY: PATIENT DOES NOT DRINK
HOW OFTEN DO YOU HAVE A DRINK CONTAINING ALCOHOL: NEVER

## 2024-09-09 ASSESSMENT — ENCOUNTER SYMPTOMS: SHORTNESS OF BREATH: 0

## 2024-10-25 ENCOUNTER — OFFICE VISIT (OUTPATIENT)
Dept: UROLOGY | Age: 79
End: 2024-10-25

## 2024-10-25 DIAGNOSIS — C61 MALIGNANT NEOPLASM OF PROSTATE (HCC): Primary | ICD-10-CM

## 2024-10-25 LAB
BILIRUBIN, URINE, POC: NORMAL
BLOOD URINE, POC: NORMAL
GLUCOSE URINE, POC: NEGATIVE MG/DL
KETONES, URINE, POC: NORMAL MG/DL
LEUKOCYTE ESTERASE, URINE, POC: NEGATIVE
NITRITE, URINE, POC: NEGATIVE
PH, URINE, POC: 5.5 (ref 4.6–8)
PROTEIN,URINE, POC: NEGATIVE MG/DL
SPECIFIC GRAVITY, URINE, POC: 1.02 (ref 1–1.03)
URINALYSIS CLARITY, POC: NORMAL
URINALYSIS COLOR, POC: NORMAL
UROBILINOGEN, POC: NORMAL MG/DL

## 2024-10-25 NOTE — PROGRESS NOTES
[facility]  [address] [place] [address]  [phone]    Mike Bauman  : 1945     HPI   79 y.o., male returns in follow up for gross hematuria and CaP. Previous hematuria work up on 2017 revealed a tiny R renal stone and PU stone which he subsequently passed.  Denies any recurrrent gross hematuria.  S/P combined EBRT (24tx) and brachytherapy with short course HAT which was completed on 13 in [facility]. PSA was und 14; 0.125 on 5/8/15; 0.077 on 11/10/15; 0.104 on 6/3/16; 0.117 on 16; 0.138 on 17; 0.155 on 18;  0.167 on 18; 0.229 on 19; 0.395 on 20; 0.5 on 21; 0.865 on 21 and 1.6 on 22.   PMSA PET scan completed on 3/21/22 showed L external and common iliac lymphadenopathy consistent with metastatic disease.  Completed salvage EBRT under direction of Dr. Snider on 22.  Remains on ADT and abiraterone under direction of Dr. Humenik.  Reports stable nocturia 1x per night off Proscar. Previously stopped Flomax due to concerns with dizziness. Reports ED but uninterested in tx. Retired . PSA remains und 5/15/24.  Reports wife is dealing with progressive dementia.       Past Medical History:   Diagnosis Date    Aortic valve sclerosis     Bradycardia     Bundle branch block          Cancer (HCC)     prostate and lymph nodes    Diverticulitis     High cholesterol     History of basal cell cancer     forehead    History of colonic polyps     History of prostate cancer     External Beam and brachytherapy    Hypertension     Mild aortic stenosis     PVC (premature ventricular contraction)     Varicella      Past Surgical History:   Procedure Laterality Date    CAROTID ENDARTERECTOMY Right 12/15/14    COLONOSCOPY  2012    LYMPH NODE DISSECTION      PACEMAKER  2019     Current Outpatient Medications   Medication Sig Dispense Refill    losartan-hydroCHLOROthiazide (HYZAAR) 100-12.5 MG per tablet Take 1 tablet by mouth

## 2024-10-29 ENCOUNTER — OFFICE VISIT (OUTPATIENT)
Dept: VASCULAR SURGERY | Age: 79
End: 2024-10-29
Payer: MEDICARE

## 2024-10-29 VITALS
HEART RATE: 70 BPM | HEIGHT: 70 IN | WEIGHT: 174 LBS | DIASTOLIC BLOOD PRESSURE: 78 MMHG | OXYGEN SATURATION: 95 % | BODY MASS INDEX: 24.91 KG/M2 | SYSTOLIC BLOOD PRESSURE: 127 MMHG

## 2024-10-29 DIAGNOSIS — I73.9 PVD (PERIPHERAL VASCULAR DISEASE) WITH CLAUDICATION (HCC): Primary | ICD-10-CM

## 2024-10-29 PROCEDURE — G8484 FLU IMMUNIZE NO ADMIN: HCPCS | Performed by: SURGERY

## 2024-10-29 PROCEDURE — 3074F SYST BP LT 130 MM HG: CPT | Performed by: SURGERY

## 2024-10-29 PROCEDURE — 3078F DIAST BP <80 MM HG: CPT | Performed by: SURGERY

## 2024-10-29 PROCEDURE — 1036F TOBACCO NON-USER: CPT | Performed by: SURGERY

## 2024-10-29 PROCEDURE — G8420 CALC BMI NORM PARAMETERS: HCPCS | Performed by: SURGERY

## 2024-10-29 PROCEDURE — 1123F ACP DISCUSS/DSCN MKR DOCD: CPT | Performed by: SURGERY

## 2024-10-29 PROCEDURE — 99213 OFFICE O/P EST LOW 20 MIN: CPT | Performed by: SURGERY

## 2024-10-29 PROCEDURE — G8427 DOCREV CUR MEDS BY ELIG CLIN: HCPCS | Performed by: SURGERY

## 2024-10-29 PROCEDURE — 1159F MED LIST DOCD IN RCRD: CPT | Performed by: SURGERY

## 2024-10-29 NOTE — PROGRESS NOTES
[address] [place] [id number]  [phone] FAX: [phone]    Mike Bauman  : 1945    Chief Complaint:     History of Present Illness:   Patient follows up today for follow-up duplex study.  Patient status post right carotid endarterectomy at outside facility several years back.  Patient denies any neurological symptoms.    CURRENT MEDICATIONS:  Current Outpatient Medications   Medication Sig Dispense Refill    losartan-hydroCHLOROthiazide (HYZAAR) 100-12.5 MG per tablet Take 1 tablet by mouth Daily with supper 90 tablet 1    atorvastatin (LIPITOR) 20 MG tablet Take 1 tablet by mouth nightly 90 tablet 1    amLODIPine (NORVASC) 5 MG tablet Take 1 tablet by mouth daily 90 tablet 1    XARELTO 20 MG TABS tablet TAKE 1 TABLET BY MOUTH ONCE DAILY WITH SUPPER 90 tablet 3    Multiple Vitamins-Minerals (PRESERVISION AREDS 2 PO) Take by mouth      vitamin D (CHOLECALCIFEROL) 125 MCG (5000 UT) CAPS capsule Take 1 capsule by mouth daily       No current facility-administered medications for this visit.       Past Medical History:   Diagnosis Date    Aortic valve sclerosis     Bradycardia     Bundle branch block          Cancer (HCC)     prostate and lymph nodes    Diverticulitis     High cholesterol     History of basal cell cancer     forehead    History of colonic polyps     History of prostate cancer 2013    External Beam and brachytherapy    Hypertension     Mild aortic stenosis     PVC (premature ventricular contraction)     Varicella        Physical Examination:   Height: 1.778 m (5' 10\"), Weight - Scale: 78.9 kg (174 lb), BP: 127/78    Constitutional: he appears well-developed. No distress.   HENT:   Head: Atraumatic.   Eyes: Pupils are equal, round, and reactive to light.   Neck: Normal range of motion.   Cardiovascular: Regular rhythm.    Pulmonary/Chest: Effort normal and breath sounds normal. No respiratory distress.   Abdominal: Soft. Bowel sounds are normal. he exhibits

## 2024-12-04 DIAGNOSIS — I48.19 PERSISTENT ATRIAL FIBRILLATION (HCC): Primary | ICD-10-CM

## 2024-12-04 NOTE — TELEPHONE ENCOUNTER
Requested Prescriptions     Pending Prescriptions Disp Refills    XARELTO 20 MG TABS tablet [Pharmacy Med Name: Xarelto 20 MG Oral Tablet] 90 tablet 0     Sig: TAKE 1 TABLET BY MOUTH ONCE DAILY WITH SUPPER

## 2024-12-05 RX ORDER — RIVAROXABAN 20 MG/1
TABLET, FILM COATED ORAL
Qty: 90 TABLET | Refills: 0 | Status: SHIPPED | OUTPATIENT
Start: 2024-12-05

## 2024-12-17 DIAGNOSIS — I48.19 PERSISTENT ATRIAL FIBRILLATION (HCC): ICD-10-CM

## 2024-12-17 NOTE — TELEPHONE ENCOUNTER
Requested Prescriptions     Pending Prescriptions Disp Refills    rivaroxaban (XARELTO) 20 MG TABS tablet 90 tablet 3     Sig: Take 1 tablet by mouth Daily with supper    Verified rx in last OV date 7/5/24. Pharmacy confirmed. Erx as requested

## 2025-01-06 ASSESSMENT — PATIENT HEALTH QUESTIONNAIRE - PHQ9
1. LITTLE INTEREST OR PLEASURE IN DOING THINGS: NOT AT ALL
SUM OF ALL RESPONSES TO PHQ QUESTIONS 1-9: 0
1. LITTLE INTEREST OR PLEASURE IN DOING THINGS: NOT AT ALL
2. FEELING DOWN, DEPRESSED OR HOPELESS: NOT AT ALL
SUM OF ALL RESPONSES TO PHQ9 QUESTIONS 1 & 2: 0
SUM OF ALL RESPONSES TO PHQ9 QUESTIONS 1 & 2: 0
2. FEELING DOWN, DEPRESSED OR HOPELESS: NOT AT ALL
SUM OF ALL RESPONSES TO PHQ QUESTIONS 1-9: 0

## 2025-01-09 ENCOUNTER — OFFICE VISIT (OUTPATIENT)
Dept: INTERNAL MEDICINE CLINIC | Facility: CLINIC | Age: 80
End: 2025-01-09

## 2025-01-09 VITALS
TEMPERATURE: 97.7 F | SYSTOLIC BLOOD PRESSURE: 116 MMHG | BODY MASS INDEX: 25.05 KG/M2 | HEART RATE: 68 BPM | HEIGHT: 70 IN | DIASTOLIC BLOOD PRESSURE: 67 MMHG | WEIGHT: 175 LBS | OXYGEN SATURATION: 96 %

## 2025-01-09 DIAGNOSIS — C61 PROSTATE CANCER (HCC): ICD-10-CM

## 2025-01-09 DIAGNOSIS — I10 ESSENTIAL HYPERTENSION: ICD-10-CM

## 2025-01-09 DIAGNOSIS — I35.0 MILD AORTIC STENOSIS: ICD-10-CM

## 2025-01-09 DIAGNOSIS — I10 ESSENTIAL HYPERTENSION: Primary | ICD-10-CM

## 2025-01-09 DIAGNOSIS — E78.49 OTHER HYPERLIPIDEMIA: ICD-10-CM

## 2025-01-09 DIAGNOSIS — I48.19 PERSISTENT ATRIAL FIBRILLATION (HCC): ICD-10-CM

## 2025-01-09 PROBLEM — I48.92 ATRIAL FLUTTER, UNSPECIFIED TYPE (HCC): Status: RESOLVED | Noted: 2023-01-18 | Resolved: 2025-01-09

## 2025-01-09 LAB
ALBUMIN SERPL-MCNC: 3.8 G/DL (ref 3.2–4.6)
ALBUMIN/GLOB SERPL: 1.2 (ref 1–1.9)
ALP SERPL-CCNC: 78 U/L (ref 40–129)
ALT SERPL-CCNC: 24 U/L (ref 8–55)
ANION GAP SERPL CALC-SCNC: 10 MMOL/L (ref 7–16)
AST SERPL-CCNC: 32 U/L (ref 15–37)
BASOPHILS # BLD: 0.04 K/UL (ref 0–0.2)
BASOPHILS NFR BLD: 0.7 % (ref 0–2)
BILIRUB DIRECT SERPL-MCNC: <0.2 MG/DL (ref 0–0.4)
BILIRUB SERPL-MCNC: 0.6 MG/DL (ref 0–1.2)
BUN SERPL-MCNC: 21 MG/DL (ref 8–23)
CALCIUM SERPL-MCNC: 9.5 MG/DL (ref 8.8–10.2)
CHLORIDE SERPL-SCNC: 101 MMOL/L (ref 98–107)
CHOLEST SERPL-MCNC: 184 MG/DL (ref 0–200)
CO2 SERPL-SCNC: 28 MMOL/L (ref 20–29)
CREAT SERPL-MCNC: 0.95 MG/DL (ref 0.8–1.3)
DIFFERENTIAL METHOD BLD: NORMAL
EOSINOPHIL # BLD: 0.14 K/UL (ref 0–0.8)
EOSINOPHIL NFR BLD: 2.4 % (ref 0.5–7.8)
ERYTHROCYTE [DISTWIDTH] IN BLOOD BY AUTOMATED COUNT: 12.9 % (ref 11.9–14.6)
GLOBULIN SER CALC-MCNC: 3.2 G/DL (ref 2.3–3.5)
GLUCOSE SERPL-MCNC: 92 MG/DL (ref 70–99)
HCT VFR BLD AUTO: 41.6 % (ref 41.1–50.3)
HDLC SERPL-MCNC: 69 MG/DL (ref 40–60)
HDLC SERPL: 2.7 (ref 0–5)
HGB BLD-MCNC: 14 G/DL (ref 13.6–17.2)
IMM GRANULOCYTES # BLD AUTO: 0.02 K/UL (ref 0–0.5)
IMM GRANULOCYTES NFR BLD AUTO: 0.3 % (ref 0–5)
LDLC SERPL CALC-MCNC: 99 MG/DL (ref 0–100)
LYMPHOCYTES # BLD: 1.26 K/UL (ref 0.5–4.6)
LYMPHOCYTES NFR BLD: 21.7 % (ref 13–44)
MCH RBC QN AUTO: 31 PG (ref 26.1–32.9)
MCHC RBC AUTO-ENTMCNC: 33.7 G/DL (ref 31.4–35)
MCV RBC AUTO: 92.2 FL (ref 82–102)
MONOCYTES # BLD: 0.55 K/UL (ref 0.1–1.3)
MONOCYTES NFR BLD: 9.5 % (ref 4–12)
NEUTS SEG # BLD: 3.8 K/UL (ref 1.7–8.2)
NEUTS SEG NFR BLD: 65.4 % (ref 43–78)
NRBC # BLD: 0 K/UL (ref 0–0.2)
PLATELET # BLD AUTO: 197 K/UL (ref 150–450)
PMV BLD AUTO: 9.8 FL (ref 9.4–12.3)
POTASSIUM SERPL-SCNC: 4.2 MMOL/L (ref 3.5–5.1)
PROT SERPL-MCNC: 7 G/DL (ref 6.3–8.2)
RBC # BLD AUTO: 4.51 M/UL (ref 4.23–5.6)
SODIUM SERPL-SCNC: 139 MMOL/L (ref 136–145)
TRIGL SERPL-MCNC: 84 MG/DL (ref 0–150)
VLDLC SERPL CALC-MCNC: 17 MG/DL (ref 6–23)
WBC # BLD AUTO: 5.8 K/UL (ref 4.3–11.1)

## 2025-01-09 RX ORDER — ATORVASTATIN CALCIUM 20 MG/1
TABLET, FILM COATED ORAL
Qty: 90 TABLET | Refills: 1 | Status: SHIPPED | OUTPATIENT
Start: 2025-01-09

## 2025-01-09 RX ORDER — LOSARTAN POTASSIUM AND HYDROCHLOROTHIAZIDE 12.5; 1 MG/1; MG/1
1 TABLET ORAL
Qty: 90 TABLET | Refills: 1 | Status: SHIPPED | OUTPATIENT
Start: 2025-01-09

## 2025-01-09 RX ORDER — AMLODIPINE BESYLATE 5 MG/1
5 TABLET ORAL DAILY
Qty: 90 TABLET | Refills: 1 | Status: SHIPPED | OUTPATIENT
Start: 2025-01-09

## 2025-01-09 SDOH — ECONOMIC STABILITY: FOOD INSECURITY: WITHIN THE PAST 12 MONTHS, YOU WORRIED THAT YOUR FOOD WOULD RUN OUT BEFORE YOU GOT MONEY TO BUY MORE.: NEVER TRUE

## 2025-01-09 SDOH — ECONOMIC STABILITY: FOOD INSECURITY: WITHIN THE PAST 12 MONTHS, THE FOOD YOU BOUGHT JUST DIDN'T LAST AND YOU DIDN'T HAVE MONEY TO GET MORE.: NEVER TRUE

## 2025-01-09 ASSESSMENT — ANXIETY QUESTIONNAIRES
1. FEELING NERVOUS, ANXIOUS, OR ON EDGE: NOT AT ALL
GAD7 TOTAL SCORE: 0
2. NOT BEING ABLE TO STOP OR CONTROL WORRYING: NOT AT ALL
5. BEING SO RESTLESS THAT IT IS HARD TO SIT STILL: NOT AT ALL
IF YOU CHECKED OFF ANY PROBLEMS ON THIS QUESTIONNAIRE, HOW DIFFICULT HAVE THESE PROBLEMS MADE IT FOR YOU TO DO YOUR WORK, TAKE CARE OF THINGS AT HOME, OR GET ALONG WITH OTHER PEOPLE: NOT DIFFICULT AT ALL
7. FEELING AFRAID AS IF SOMETHING AWFUL MIGHT HAPPEN: NOT AT ALL
4. TROUBLE RELAXING: NOT AT ALL
3. WORRYING TOO MUCH ABOUT DIFFERENT THINGS: NOT AT ALL
6. BECOMING EASILY ANNOYED OR IRRITABLE: NOT AT ALL

## 2025-01-09 ASSESSMENT — ENCOUNTER SYMPTOMS: SHORTNESS OF BREATH: 0

## 2025-01-09 NOTE — PROGRESS NOTES
Judgment: Judgment normal.           Assessment/Plan:  Mike was seen today for hypertension.    Diagnoses and all orders for this visit:    Essential hypertension  -     amLODIPine (NORVASC) 5 MG tablet; Take 1 tablet by mouth daily  -     losartan-hydroCHLOROthiazide (HYZAAR) 100-12.5 MG per tablet; Take 1 tablet by mouth Daily with supper  -     Basic Metabolic Panel; Future  -     CBC with Auto Differential; Future    Other hyperlipidemia  -     atorvastatin (LIPITOR) 20 MG tablet; Take 1 tablet by mouth nightly  -     Hepatic Function Panel; Future  -     Lipid Panel; Future    Prostate cancer (HCC)    Mild aortic stenosis    Persistent atrial fibrillation (HCC)        Return in about 4 months (around 5/9/2025), or if symptoms worsen or fail to improve.  __  Jake Werner M.D.

## 2025-02-25 ENCOUNTER — TELEPHONE (OUTPATIENT)
Dept: UROLOGY | Age: 80
End: 2025-02-25

## 2025-02-25 ENCOUNTER — OFFICE VISIT (OUTPATIENT)
Dept: UROLOGY | Age: 80
End: 2025-02-25
Payer: MEDICARE

## 2025-02-25 DIAGNOSIS — N40.1 BENIGN PROSTATIC HYPERPLASIA WITH LOWER URINARY TRACT SYMPTOMS, SYMPTOM DETAILS UNSPECIFIED: ICD-10-CM

## 2025-02-25 DIAGNOSIS — C61 MALIGNANT NEOPLASM OF PROSTATE (HCC): ICD-10-CM

## 2025-02-25 DIAGNOSIS — R31.0 GROSS HEMATURIA: Primary | ICD-10-CM

## 2025-02-25 LAB
BILIRUBIN, URINE, POC: NEGATIVE
BLOOD URINE, POC: NORMAL
GLUCOSE URINE, POC: NEGATIVE MG/DL
KETONES, URINE, POC: NEGATIVE MG/DL
LEUKOCYTE ESTERASE, URINE, POC: NEGATIVE
NITRITE, URINE, POC: NEGATIVE
PH, URINE, POC: 6 (ref 4.6–8)
PROTEIN,URINE, POC: NORMAL MG/DL
PVR, POC: 0 CC
SPECIFIC GRAVITY, URINE, POC: 1.02 (ref 1–1.03)
URINALYSIS CLARITY, POC: NORMAL
URINALYSIS COLOR, POC: NORMAL
UROBILINOGEN, POC: NORMAL MG/DL

## 2025-02-25 PROCEDURE — G8428 CUR MEDS NOT DOCUMENT: HCPCS | Performed by: NURSE PRACTITIONER

## 2025-02-25 PROCEDURE — 81003 URINALYSIS AUTO W/O SCOPE: CPT | Performed by: NURSE PRACTITIONER

## 2025-02-25 PROCEDURE — 99214 OFFICE O/P EST MOD 30 MIN: CPT | Performed by: NURSE PRACTITIONER

## 2025-02-25 PROCEDURE — 1123F ACP DISCUSS/DSCN MKR DOCD: CPT | Performed by: NURSE PRACTITIONER

## 2025-02-25 PROCEDURE — 51798 US URINE CAPACITY MEASURE: CPT | Performed by: NURSE PRACTITIONER

## 2025-02-25 PROCEDURE — 1036F TOBACCO NON-USER: CPT | Performed by: NURSE PRACTITIONER

## 2025-02-25 PROCEDURE — G8419 CALC BMI OUT NRM PARAM NOF/U: HCPCS | Performed by: NURSE PRACTITIONER

## 2025-02-25 ASSESSMENT — ENCOUNTER SYMPTOMS: NAUSEA: 0

## 2025-02-25 NOTE — PROGRESS NOTES
[facility]  [address] [place] [address]  [phone]          Mike Bauman  : 1945    Chief Complaint   Patient presents with    Hematuria          HPI     Mike Bauman is a 79 y.o. male  Here reporting that he is seeing blood in the urine periodically. The first episode was 2/15/25.  Since that time he has seen the blood every few days. He is not experiencing any pain. No clots are seen, the urine itself is red.  The blood is noticeable throughout the entire stream. He had hematuria work up in 2017 due to gross blood. He has been followed in the past by Dr. Love. PSA 2025 stable at <0.010    He has CaP S/P combined EBRT (24tx) and brachytherapy with short course HAT which was completed on 13 in [facility]. PSA was und 14; 0.125 on 5/8/15; 0.077 on 11/10/15; 0.104 on 6/3/16; 0.117 on 16; 0.138 on 17; 0.155 on 18;  0.167 on 18; 0.229 on 19; 0.395 on 20; 0.5 on 21; 0.865 on 21 and 1.6 on 22.   PMSA PET scan completed on 3/21/22 showed L external and common iliac lymphadenopathy consistent with metastatic disease.  Completed salvage EBRT under direction of Dr. Snider on 22.  Remains on ADT and abiraterone under direction of Dr. Humenik.  Reports stable nocturia 1x per night off Proscar. Previously stopped Flomax due to concerns with dizziness. Reports ED but uninterested in tx. Retired . PSA remains und 5/15/24.  Reports wife        Past Medical History:   Diagnosis Date    Aortic valve sclerosis     Bradycardia     Bundle branch block          Cancer (HCC)     prostate and lymph nodes    Diverticulitis     High cholesterol     History of basal cell cancer     forehead    History of colonic polyps     History of prostate cancer     External Beam and brachytherapy    Hypertension     Mild aortic stenosis     PVC (premature ventricular contraction)     Varicella      Past Surgical History:   Procedure

## 2025-05-09 ENCOUNTER — OFFICE VISIT (OUTPATIENT)
Dept: INTERNAL MEDICINE CLINIC | Facility: CLINIC | Age: 80
End: 2025-05-09

## 2025-05-09 VITALS
DIASTOLIC BLOOD PRESSURE: 75 MMHG | WEIGHT: 175.3 LBS | HEIGHT: 71 IN | HEART RATE: 76 BPM | SYSTOLIC BLOOD PRESSURE: 127 MMHG | BODY MASS INDEX: 24.54 KG/M2 | OXYGEN SATURATION: 97 % | TEMPERATURE: 97.2 F

## 2025-05-09 DIAGNOSIS — I65.29 STENOSIS OF CAROTID ARTERY, UNSPECIFIED LATERALITY: ICD-10-CM

## 2025-05-09 DIAGNOSIS — C61 PROSTATE CANCER (HCC): ICD-10-CM

## 2025-05-09 DIAGNOSIS — C61 MALIGNANT NEOPLASM OF PROSTATE (HCC): ICD-10-CM

## 2025-05-09 DIAGNOSIS — I10 ESSENTIAL HYPERTENSION: Primary | ICD-10-CM

## 2025-05-09 DIAGNOSIS — E78.49 OTHER HYPERLIPIDEMIA: ICD-10-CM

## 2025-05-09 DIAGNOSIS — I35.0 MODERATE AORTIC STENOSIS: ICD-10-CM

## 2025-05-09 DIAGNOSIS — I48.19 PERSISTENT ATRIAL FIBRILLATION (HCC): ICD-10-CM

## 2025-05-09 RX ORDER — AMLODIPINE BESYLATE 5 MG/1
5 TABLET ORAL DAILY
Qty: 90 TABLET | Refills: 1 | Status: SHIPPED | OUTPATIENT
Start: 2025-05-09

## 2025-05-09 RX ORDER — LOSARTAN POTASSIUM AND HYDROCHLOROTHIAZIDE 12.5; 1 MG/1; MG/1
1 TABLET ORAL
Qty: 90 TABLET | Refills: 1 | Status: SHIPPED | OUTPATIENT
Start: 2025-05-09

## 2025-05-09 RX ORDER — ATORVASTATIN CALCIUM 20 MG/1
TABLET, FILM COATED ORAL
Qty: 90 TABLET | Refills: 1 | Status: SHIPPED | OUTPATIENT
Start: 2025-05-09

## 2025-05-09 ASSESSMENT — ENCOUNTER SYMPTOMS: SHORTNESS OF BREATH: 0

## 2025-05-09 NOTE — PROGRESS NOTES
[facility]  Jake Werner M.D.  Internal Medicine  [address] [place] [id number]  Office : [phone]  Fax : [phone]    Chief Complaint   Patient presents with    Hypertension     4 mo follow up       History of Present Illness:  Mike Bauman is a 79 y.o. male.  HPI    Hypertension  Patient is in for Hypertension which is stable.    Diet and Lifestyle: generally follows a low sodium diet  exercises sporadically  nonsmoker  Home BP Monitoring: is not measured at home .Patient's recent blood pressures were   BP Readings from Last 3 Encounters:   05/09/25 127/75   01/09/25 116/67   10/29/24 127/78   .   taking medications as instructed, no medication side effects noted, no TIA's, no chest pain on exertion, no dyspnea on exertion, no swelling of ankles. Cardiac risk factors consist of advanced age (older than 55 for men, 65 for women), dyslipidemia, hypertension, and male gender.  Lab Results   Component Value Date/Time     01/09/2025 09:33 AM    K 4.2 01/09/2025 09:33 AM     01/09/2025 09:33 AM    CO2 28 01/09/2025 09:33 AM    BUN 21 01/09/2025 09:33 AM    CREATININE 0.95 01/09/2025 09:33 AM    GLUCOSE 92 01/09/2025 09:33 AM    CALCIUM 9.5 01/09/2025 09:33 AM    LABGLOM 82 01/09/2025 09:33 AM    LABGLOM >60 01/08/2024 09:45 AM        Hyperlipidemia  Patient is in for follow-up for hyperlipidemia.  Diet and Lifestyle: generally follows a low fat low cholesterol diet. Risk factors for vascular disease consist of hyperlipidemia and hypertension. Last LDL was   Lab Results   Component Value Date    LDL 99 01/09/2025   . Last ALT was   Lab Results   Component Value Date/Time    ALT 24 01/09/2025 09:33 AM   .  No muscle aches.    Prostate cancer  Has recurred after 5 years from original diagnosis in lymph nodes.   Finished RT.  PSA followed at [facility] = <0.01    Carotid Stenosis  Followed by vascular surgery.  Had carotid doppler in  March

## 2025-08-11 ENCOUNTER — OFFICE VISIT (OUTPATIENT)
Dept: INTERNAL MEDICINE CLINIC | Facility: CLINIC | Age: 80
End: 2025-08-11

## 2025-08-11 VITALS
DIASTOLIC BLOOD PRESSURE: 68 MMHG | OXYGEN SATURATION: 98 % | TEMPERATURE: 97.2 F | HEART RATE: 72 BPM | BODY MASS INDEX: 24.08 KG/M2 | HEIGHT: 71 IN | WEIGHT: 172 LBS | SYSTOLIC BLOOD PRESSURE: 131 MMHG

## 2025-08-11 DIAGNOSIS — R29.898 DECREASED GRIP STRENGTH OF LEFT HAND: ICD-10-CM

## 2025-08-11 DIAGNOSIS — R20.2 NUMBNESS AND TINGLING IN LEFT ARM: Primary | ICD-10-CM

## 2025-08-11 DIAGNOSIS — R20.0 NUMBNESS AND TINGLING IN LEFT ARM: Primary | ICD-10-CM

## 2025-08-11 ASSESSMENT — PATIENT HEALTH QUESTIONNAIRE - PHQ9
2. FEELING DOWN, DEPRESSED OR HOPELESS: NOT AT ALL
SUM OF ALL RESPONSES TO PHQ QUESTIONS 1-9: 0
SUM OF ALL RESPONSES TO PHQ QUESTIONS 1-9: 0
1. LITTLE INTEREST OR PLEASURE IN DOING THINGS: NOT AT ALL
SUM OF ALL RESPONSES TO PHQ QUESTIONS 1-9: 0
SUM OF ALL RESPONSES TO PHQ QUESTIONS 1-9: 0

## 2025-08-20 ENCOUNTER — CLINICAL SUPPORT (OUTPATIENT)
Age: 80
End: 2025-08-20
Payer: MEDICARE

## 2025-08-20 ENCOUNTER — OFFICE VISIT (OUTPATIENT)
Age: 80
End: 2025-08-20
Payer: MEDICARE

## 2025-08-20 ENCOUNTER — TELEPHONE (OUTPATIENT)
Age: 80
End: 2025-08-20

## 2025-08-20 VITALS
HEART RATE: 60 BPM | WEIGHT: 176 LBS | SYSTOLIC BLOOD PRESSURE: 120 MMHG | BODY MASS INDEX: 24.64 KG/M2 | HEIGHT: 71 IN | DIASTOLIC BLOOD PRESSURE: 68 MMHG

## 2025-08-20 DIAGNOSIS — I48.19 PERSISTENT ATRIAL FIBRILLATION (HCC): ICD-10-CM

## 2025-08-20 DIAGNOSIS — I48.91 UNSPECIFIED ATRIAL FIBRILLATION (HCC): Primary | ICD-10-CM

## 2025-08-20 DIAGNOSIS — I35.0 NONRHEUMATIC AORTIC VALVE STENOSIS: Primary | ICD-10-CM

## 2025-08-20 DIAGNOSIS — I44.2 CHB (COMPLETE HEART BLOCK) (HCC): Primary | ICD-10-CM

## 2025-08-20 PROCEDURE — 1036F TOBACCO NON-USER: CPT | Performed by: INTERNAL MEDICINE

## 2025-08-20 PROCEDURE — 3074F SYST BP LT 130 MM HG: CPT | Performed by: INTERNAL MEDICINE

## 2025-08-20 PROCEDURE — 1126F AMNT PAIN NOTED NONE PRSNT: CPT | Performed by: INTERNAL MEDICINE

## 2025-08-20 PROCEDURE — 3078F DIAST BP <80 MM HG: CPT | Performed by: INTERNAL MEDICINE

## 2025-08-20 PROCEDURE — 1123F ACP DISCUSS/DSCN MKR DOCD: CPT | Performed by: INTERNAL MEDICINE

## 2025-08-20 PROCEDURE — G8428 CUR MEDS NOT DOCUMENT: HCPCS | Performed by: INTERNAL MEDICINE

## 2025-08-20 PROCEDURE — G8420 CALC BMI NORM PARAMETERS: HCPCS | Performed by: INTERNAL MEDICINE

## 2025-08-20 PROCEDURE — 93280 PM DEVICE PROGR EVAL DUAL: CPT | Performed by: INTERNAL MEDICINE

## 2025-08-20 PROCEDURE — 99214 OFFICE O/P EST MOD 30 MIN: CPT | Performed by: INTERNAL MEDICINE
